# Patient Record
Sex: FEMALE | Race: WHITE | Employment: OTHER | URBAN - NONMETROPOLITAN AREA
[De-identification: names, ages, dates, MRNs, and addresses within clinical notes are randomized per-mention and may not be internally consistent; named-entity substitution may affect disease eponyms.]

---

## 2017-01-20 ENCOUNTER — OFFICE VISIT (OUTPATIENT)
Dept: PULMONOLOGY | Age: 62
End: 2017-01-20

## 2017-01-20 ENCOUNTER — TELEPHONE (OUTPATIENT)
Dept: PULMONOLOGY | Age: 62
End: 2017-01-20

## 2017-01-20 VITALS
OXYGEN SATURATION: 98 % | BODY MASS INDEX: 32.46 KG/M2 | DIASTOLIC BLOOD PRESSURE: 76 MMHG | SYSTOLIC BLOOD PRESSURE: 122 MMHG | WEIGHT: 206.8 LBS | HEART RATE: 84 BPM | HEIGHT: 67 IN | TEMPERATURE: 98 F

## 2017-01-20 DIAGNOSIS — J45.20 MILD INTERMITTENT ASTHMA WITHOUT COMPLICATION: Primary | ICD-10-CM

## 2017-01-20 DIAGNOSIS — K21.9 GASTROESOPHAGEAL REFLUX DISEASE WITHOUT ESOPHAGITIS: ICD-10-CM

## 2017-01-20 DIAGNOSIS — R05.3 CHRONIC COUGH: ICD-10-CM

## 2017-01-20 DIAGNOSIS — J01.00 SUBACUTE MAXILLARY SINUSITIS: ICD-10-CM

## 2017-01-20 PROCEDURE — 99204 OFFICE O/P NEW MOD 45 MIN: CPT | Performed by: INTERNAL MEDICINE

## 2017-01-20 RX ORDER — BUDESONIDE AND FORMOTEROL FUMARATE DIHYDRATE 160; 4.5 UG/1; UG/1
2 AEROSOL RESPIRATORY (INHALATION) 2 TIMES DAILY
Qty: 1 INHALER | Refills: 11 | Status: SHIPPED | OUTPATIENT
Start: 2017-01-20 | End: 2017-01-24 | Stop reason: SDUPTHER

## 2017-01-20 RX ORDER — MULTIVITAMIN WITH IRON
250 TABLET ORAL DAILY
COMMUNITY

## 2017-01-20 RX ORDER — ALBUTEROL SULFATE 90 UG/1
2 AEROSOL, METERED RESPIRATORY (INHALATION) EVERY 6 HOURS PRN
COMMUNITY
End: 2018-07-19

## 2017-01-20 RX ORDER — DOXYCYCLINE HYCLATE 100 MG/1
100 CAPSULE ORAL 2 TIMES DAILY
Qty: 20 CAPSULE | Refills: 0 | Status: SHIPPED | OUTPATIENT
Start: 2017-01-20 | End: 2017-01-30

## 2017-01-20 RX ORDER — BENZONATATE 200 MG/1
200 CAPSULE ORAL 3 TIMES DAILY PRN
Qty: 30 CAPSULE | Refills: 2 | Status: SHIPPED | OUTPATIENT
Start: 2017-01-20 | End: 2017-02-04

## 2017-01-20 RX ORDER — PREDNISONE 20 MG/1
20 TABLET ORAL DAILY
Qty: 10 TABLET | Refills: 0 | Status: SHIPPED | OUTPATIENT
Start: 2017-01-20 | End: 2017-01-30

## 2017-01-20 RX ORDER — MULTIVIT WITH MINERALS/LUTEIN
500 TABLET ORAL DAILY
COMMUNITY
End: 2018-06-25

## 2017-01-20 RX ORDER — PANTOPRAZOLE SODIUM 40 MG/1
40 GRANULE, DELAYED RELEASE ORAL
COMMUNITY
End: 2017-03-02 | Stop reason: SDUPTHER

## 2017-01-20 RX ORDER — PANTOPRAZOLE SODIUM 40 MG/1
40 TABLET, DELAYED RELEASE ORAL DAILY
Qty: 30 TABLET | Refills: 3 | Status: SHIPPED | OUTPATIENT
Start: 2017-01-20 | End: 2017-01-24 | Stop reason: SDUPTHER

## 2017-01-20 ASSESSMENT — ENCOUNTER SYMPTOMS
BACK PAIN: 0
BLOOD IN STOOL: 0
VOMITING: 0
COUGH: 1
APNEA: 0
SORE THROAT: 1
STRIDOR: 0
SINUS PRESSURE: 0
ABDOMINAL DISTENTION: 0
ABDOMINAL PAIN: 0
CHEST TIGHTNESS: 0
CONSTIPATION: 0
FACIAL SWELLING: 0
NAUSEA: 0
PHOTOPHOBIA: 0
VOICE CHANGE: 1
RHINORRHEA: 0
CHOKING: 1

## 2017-01-24 ENCOUNTER — TELEPHONE (OUTPATIENT)
Dept: PULMONOLOGY | Age: 62
End: 2017-01-24

## 2017-01-24 DIAGNOSIS — K21.9 GASTROESOPHAGEAL REFLUX DISEASE WITHOUT ESOPHAGITIS: ICD-10-CM

## 2017-01-24 DIAGNOSIS — R05.3 CHRONIC COUGH: Primary | ICD-10-CM

## 2017-01-24 DIAGNOSIS — J01.00 SUBACUTE MAXILLARY SINUSITIS: ICD-10-CM

## 2017-01-24 DIAGNOSIS — J45.20 MILD INTERMITTENT ASTHMA WITHOUT COMPLICATION: ICD-10-CM

## 2017-01-24 RX ORDER — PANTOPRAZOLE SODIUM 40 MG/1
40 TABLET, DELAYED RELEASE ORAL DAILY
Qty: 30 TABLET | Refills: 3 | Status: SHIPPED | OUTPATIENT
Start: 2017-01-24 | End: 2017-07-05 | Stop reason: SDUPTHER

## 2017-01-24 RX ORDER — BUDESONIDE AND FORMOTEROL FUMARATE DIHYDRATE 160; 4.5 UG/1; UG/1
2 AEROSOL RESPIRATORY (INHALATION) 2 TIMES DAILY
Qty: 1 INHALER | Refills: 11 | Status: SHIPPED | OUTPATIENT
Start: 2017-01-24 | End: 2018-03-14 | Stop reason: SDUPTHER

## 2017-01-25 ENCOUNTER — TELEPHONE (OUTPATIENT)
Dept: PULMONOLOGY | Age: 62
End: 2017-01-25

## 2017-01-27 DIAGNOSIS — R05.3 CHRONIC COUGH: ICD-10-CM

## 2017-01-30 DIAGNOSIS — R05.3 CHRONIC COUGH: ICD-10-CM

## 2017-03-02 ENCOUNTER — OFFICE VISIT (OUTPATIENT)
Dept: PULMONOLOGY | Age: 62
End: 2017-03-02

## 2017-03-02 VITALS
BODY MASS INDEX: 24.01 KG/M2 | HEIGHT: 67 IN | HEART RATE: 88 BPM | TEMPERATURE: 97 F | DIASTOLIC BLOOD PRESSURE: 79 MMHG | OXYGEN SATURATION: 98 % | SYSTOLIC BLOOD PRESSURE: 113 MMHG | WEIGHT: 153 LBS

## 2017-03-02 DIAGNOSIS — J45.909 UNCOMPLICATED ASTHMA, UNSPECIFIED ASTHMA SEVERITY: Primary | ICD-10-CM

## 2017-03-02 PROCEDURE — 99213 OFFICE O/P EST LOW 20 MIN: CPT | Performed by: INTERNAL MEDICINE

## 2017-03-02 RX ORDER — MONTELUKAST SODIUM 10 MG/1
10 TABLET ORAL DAILY
Qty: 90 TABLET | Refills: 3 | Status: SHIPPED | OUTPATIENT
Start: 2017-03-02 | End: 2017-06-26

## 2017-03-02 RX ORDER — MONTELUKAST SODIUM 10 MG/1
10 TABLET ORAL DAILY
Qty: 30 TABLET | Refills: 3 | Status: SHIPPED | OUTPATIENT
Start: 2017-03-02 | End: 2017-06-26

## 2017-03-10 LAB
CHOLESTEROL, TOTAL: NORMAL MG/DL
CHOLESTEROL/HDL RATIO: NORMAL
HDLC SERPL-MCNC: NORMAL MG/DL (ref 35–70)
LDL CHOLESTEROL CALCULATED: 107 MG/DL (ref 0–160)
T4 FREE: 1.25
TRIGL SERPL-MCNC: NORMAL MG/DL
TSH SERPL DL<=0.05 MIU/L-ACNC: 1.66 UIU/ML
VLDLC SERPL CALC-MCNC: NORMAL MG/DL

## 2017-06-26 ENCOUNTER — OFFICE VISIT (OUTPATIENT)
Dept: ENDOCRINOLOGY | Age: 62
End: 2017-06-26

## 2017-06-26 VITALS
DIASTOLIC BLOOD PRESSURE: 80 MMHG | SYSTOLIC BLOOD PRESSURE: 118 MMHG | RESPIRATION RATE: 20 BRPM | HEART RATE: 68 BPM | BODY MASS INDEX: 32.65 KG/M2 | HEIGHT: 67 IN | WEIGHT: 208 LBS

## 2017-06-26 DIAGNOSIS — E03.9 ACQUIRED HYPOTHYROIDISM: Primary | ICD-10-CM

## 2017-06-26 DIAGNOSIS — E67.3 HYPERVITAMINOSIS D: ICD-10-CM

## 2017-06-26 PROCEDURE — 99213 OFFICE O/P EST LOW 20 MIN: CPT | Performed by: INTERNAL MEDICINE

## 2017-06-26 RX ORDER — LEVOTHYROXINE SODIUM 100 MCG
100 TABLET ORAL DAILY
Qty: 90 TABLET | Refills: 3 | Status: SHIPPED | OUTPATIENT
Start: 2017-06-26 | End: 2018-05-07 | Stop reason: SDUPTHER

## 2017-07-05 DIAGNOSIS — K21.9 GASTROESOPHAGEAL REFLUX DISEASE WITHOUT ESOPHAGITIS: ICD-10-CM

## 2017-07-07 RX ORDER — PANTOPRAZOLE SODIUM 40 MG/1
TABLET, DELAYED RELEASE ORAL
Qty: 90 TABLET | Refills: 3 | Status: SHIPPED | OUTPATIENT
Start: 2017-07-07 | End: 2018-06-12 | Stop reason: SDUPTHER

## 2017-08-08 ENCOUNTER — OFFICE VISIT (OUTPATIENT)
Dept: PULMONOLOGY | Age: 62
End: 2017-08-08
Payer: COMMERCIAL

## 2017-08-08 VITALS
TEMPERATURE: 96.9 F | DIASTOLIC BLOOD PRESSURE: 82 MMHG | BODY MASS INDEX: 32.46 KG/M2 | HEART RATE: 70 BPM | WEIGHT: 206.8 LBS | OXYGEN SATURATION: 98 % | HEIGHT: 67 IN | SYSTOLIC BLOOD PRESSURE: 114 MMHG

## 2017-08-08 DIAGNOSIS — J45.909 UNCOMPLICATED ASTHMA, UNSPECIFIED ASTHMA SEVERITY: Primary | ICD-10-CM

## 2017-08-08 PROCEDURE — 99213 OFFICE O/P EST LOW 20 MIN: CPT | Performed by: INTERNAL MEDICINE

## 2018-03-14 DIAGNOSIS — J45.20 MILD INTERMITTENT ASTHMA WITHOUT COMPLICATION: ICD-10-CM

## 2018-03-14 DIAGNOSIS — J01.00 SUBACUTE MAXILLARY SINUSITIS: ICD-10-CM

## 2018-03-14 RX ORDER — BUDESONIDE AND FORMOTEROL FUMARATE DIHYDRATE 160; 4.5 UG/1; UG/1
AEROSOL RESPIRATORY (INHALATION)
Qty: 30.6 G | Refills: 3 | Status: SHIPPED | OUTPATIENT
Start: 2018-03-14 | End: 2018-07-19 | Stop reason: ALTCHOICE

## 2018-05-07 ENCOUNTER — TELEPHONE (OUTPATIENT)
Dept: INTERNAL MEDICINE CLINIC | Age: 63
End: 2018-05-07

## 2018-05-07 DIAGNOSIS — E03.9 ACQUIRED HYPOTHYROIDISM: Primary | ICD-10-CM

## 2018-05-07 RX ORDER — LEVOTHYROXINE SODIUM 88 UG/1
100 TABLET ORAL DAILY
Qty: 30 TABLET | Refills: 3 | Status: SHIPPED | OUTPATIENT
Start: 2018-05-07 | End: 2018-06-25 | Stop reason: SDUPTHER

## 2018-05-21 ENCOUNTER — TELEPHONE (OUTPATIENT)
Dept: ENDOCRINOLOGY | Age: 63
End: 2018-05-21

## 2018-05-21 DIAGNOSIS — E03.9 ACQUIRED HYPOTHYROIDISM: Primary | ICD-10-CM

## 2018-06-05 ENCOUNTER — TELEPHONE (OUTPATIENT)
Dept: INTERNAL MEDICINE CLINIC | Age: 63
End: 2018-06-05

## 2018-06-12 DIAGNOSIS — K21.9 GASTROESOPHAGEAL REFLUX DISEASE WITHOUT ESOPHAGITIS: ICD-10-CM

## 2018-06-12 RX ORDER — PANTOPRAZOLE SODIUM 40 MG/1
40 TABLET, DELAYED RELEASE ORAL DAILY
Qty: 90 TABLET | Refills: 3 | Status: SHIPPED | OUTPATIENT
Start: 2018-06-12 | End: 2019-06-26 | Stop reason: ALTCHOICE

## 2018-06-19 LAB — T4 FREE: 2.48

## 2018-06-22 ENCOUNTER — TELEPHONE (OUTPATIENT)
Dept: PULMONOLOGY | Age: 63
End: 2018-06-22

## 2018-06-22 DIAGNOSIS — R06.02 SOB (SHORTNESS OF BREATH): Primary | ICD-10-CM

## 2018-06-25 ENCOUNTER — OFFICE VISIT (OUTPATIENT)
Dept: INTERNAL MEDICINE CLINIC | Age: 63
End: 2018-06-25
Payer: MEDICARE

## 2018-06-25 VITALS
HEART RATE: 64 BPM | WEIGHT: 210 LBS | BODY MASS INDEX: 32.96 KG/M2 | SYSTOLIC BLOOD PRESSURE: 120 MMHG | HEIGHT: 67 IN | DIASTOLIC BLOOD PRESSURE: 70 MMHG

## 2018-06-25 DIAGNOSIS — E03.9 HYPOTHYROIDISM, UNSPECIFIED TYPE: Primary | ICD-10-CM

## 2018-06-25 DIAGNOSIS — E55.9 VITAMIN D DEFICIENCY: ICD-10-CM

## 2018-06-25 PROCEDURE — 99204 OFFICE O/P NEW MOD 45 MIN: CPT | Performed by: INTERNAL MEDICINE

## 2018-06-25 PROCEDURE — 1036F TOBACCO NON-USER: CPT | Performed by: INTERNAL MEDICINE

## 2018-06-25 PROCEDURE — G8417 CALC BMI ABV UP PARAM F/U: HCPCS | Performed by: INTERNAL MEDICINE

## 2018-06-25 PROCEDURE — 4040F PNEUMOC VAC/ADMIN/RCVD: CPT | Performed by: INTERNAL MEDICINE

## 2018-06-25 PROCEDURE — G8427 DOCREV CUR MEDS BY ELIG CLIN: HCPCS | Performed by: INTERNAL MEDICINE

## 2018-06-25 PROCEDURE — 1123F ACP DISCUSS/DSCN MKR DOCD: CPT | Performed by: INTERNAL MEDICINE

## 2018-06-25 PROCEDURE — 3017F COLORECTAL CA SCREEN DOC REV: CPT | Performed by: INTERNAL MEDICINE

## 2018-06-25 PROCEDURE — G8400 PT W/DXA NO RESULTS DOC: HCPCS | Performed by: INTERNAL MEDICINE

## 2018-06-25 PROCEDURE — 1090F PRES/ABSN URINE INCON ASSESS: CPT | Performed by: INTERNAL MEDICINE

## 2018-06-25 RX ORDER — LEVOTHYROXINE SODIUM 88 UG/1
100 TABLET ORAL DAILY
Qty: 90 TABLET | Refills: 3 | Status: SHIPPED | OUTPATIENT
Start: 2018-06-25 | End: 2019-07-12 | Stop reason: SDUPTHER

## 2018-06-25 RX ORDER — FLUTICASONE PROPIONATE 50 MCG
1 SPRAY, SUSPENSION (ML) NASAL DAILY
COMMUNITY
End: 2019-06-26 | Stop reason: ALTCHOICE

## 2018-06-25 ASSESSMENT — PATIENT HEALTH QUESTIONNAIRE - PHQ9
1. LITTLE INTEREST OR PLEASURE IN DOING THINGS: 0
2. FEELING DOWN, DEPRESSED OR HOPELESS: 0
SUM OF ALL RESPONSES TO PHQ9 QUESTIONS 1 & 2: 0
SUM OF ALL RESPONSES TO PHQ QUESTIONS 1-9: 0

## 2018-07-05 DIAGNOSIS — R06.02 SOB (SHORTNESS OF BREATH): ICD-10-CM

## 2018-07-19 ENCOUNTER — OFFICE VISIT (OUTPATIENT)
Dept: CARDIOLOGY CLINIC | Age: 63
End: 2018-07-19
Payer: MEDICARE

## 2018-07-19 VITALS
HEIGHT: 67 IN | DIASTOLIC BLOOD PRESSURE: 84 MMHG | WEIGHT: 210 LBS | BODY MASS INDEX: 32.96 KG/M2 | HEART RATE: 73 BPM | SYSTOLIC BLOOD PRESSURE: 108 MMHG

## 2018-07-19 DIAGNOSIS — I48.0 PAF (PAROXYSMAL ATRIAL FIBRILLATION) (HCC): ICD-10-CM

## 2018-07-19 PROCEDURE — 1101F PT FALLS ASSESS-DOCD LE1/YR: CPT | Performed by: INTERNAL MEDICINE

## 2018-07-19 PROCEDURE — 1036F TOBACCO NON-USER: CPT | Performed by: INTERNAL MEDICINE

## 2018-07-19 PROCEDURE — G8417 CALC BMI ABV UP PARAM F/U: HCPCS | Performed by: INTERNAL MEDICINE

## 2018-07-19 PROCEDURE — 3017F COLORECTAL CA SCREEN DOC REV: CPT | Performed by: INTERNAL MEDICINE

## 2018-07-19 PROCEDURE — G8400 PT W/DXA NO RESULTS DOC: HCPCS | Performed by: INTERNAL MEDICINE

## 2018-07-19 PROCEDURE — 93000 ELECTROCARDIOGRAM COMPLETE: CPT | Performed by: INTERNAL MEDICINE

## 2018-07-19 PROCEDURE — 1123F ACP DISCUSS/DSCN MKR DOCD: CPT | Performed by: INTERNAL MEDICINE

## 2018-07-19 PROCEDURE — 99205 OFFICE O/P NEW HI 60 MIN: CPT | Performed by: INTERNAL MEDICINE

## 2018-07-19 PROCEDURE — G8427 DOCREV CUR MEDS BY ELIG CLIN: HCPCS | Performed by: INTERNAL MEDICINE

## 2018-07-19 PROCEDURE — 4040F PNEUMOC VAC/ADMIN/RCVD: CPT | Performed by: INTERNAL MEDICINE

## 2018-07-19 PROCEDURE — 1090F PRES/ABSN URINE INCON ASSESS: CPT | Performed by: INTERNAL MEDICINE

## 2018-07-19 RX ORDER — AMIODARONE HYDROCHLORIDE 200 MG/1
200 TABLET ORAL DAILY
COMMUNITY
End: 2018-08-31

## 2018-07-19 RX ORDER — DILTIAZEM HYDROCHLORIDE 60 MG/1
60 TABLET, FILM COATED ORAL 2 TIMES DAILY
COMMUNITY
End: 2018-09-04 | Stop reason: ALTCHOICE

## 2018-07-19 ASSESSMENT — ENCOUNTER SYMPTOMS
BACK PAIN: 0
RIGHT EYE: 0
BLURRED VISION: 0
DIARRHEA: 0
VOMITING: 0
COUGH: 0
CONSTIPATION: 0
SHORTNESS OF BREATH: 1
LEFT EYE: 0
WHEEZING: 0
NAUSEA: 0
SORE THROAT: 0
ABDOMINAL PAIN: 0
DOUBLE VISION: 0

## 2018-07-19 NOTE — PROGRESS NOTES
[facility] [address] [place] [address], [facility]  Dept: [phone]  Dept Fax: [phone]      CARDIAC ELECTROPHYSIOLOGY  CONSULTATION    7/19/2018      REFERRING PROVIDER:  Dr. Ines Zepeda:  I have been getting short of breath and having chest pains. Chief Complaint   Patient presents with    New Patient     Gina Hoover pt, PAF       HPI:  HPI    07/19/2018: The patient is a 69 y/o female that has a h/o asthma. She reports becoming short of breath on 6/28/2018. The patient called Dr. Bailey Cruz and he sent her for a PFT. The patient went to have the PFT and she was told that her heart rhythm was abnormal and was sent to the ED. The patient was found to be in atrial fibrillation with a rapid ventricular response. The patient was started on IV Diltiazem and she converted spontaneously back to sinus rhythm. The patient states she felt immediately better after she converted back to sinus rhythm. She states that the episodes of shortness of breath are associated with chest pain. She denies having any wheezing with the episodes. The patient was started on Xarelto and Flecainide. She was seen in the office by Dr. Gina Hoover on 7/2/2018 and he switched the patient to amiodarone. The patient has been taking amiodarone for the past 11 days. She is still having episodes of shortness of breath and associated chest pain. The patient states she had an episode that began on Monday of this week and lasted for 36 hours. The patient is being referred for further evaluation and management of recurrent symptomatic paroxysmal atrial fibrillation. PMH:  History obtained from chart review and the patient.   Past Medical History:   Diagnosis Date    Anemia     Asthma     Backache     Colitis     Eczema     Fibromyalgia     Fibula fracture 1999    Hernia, hiatal 2015    Hypoglycemia     Hypothyroidism     [facility]    Perianal abscess Mouth/Throat: Oropharynx is clear and moist. Mucous membranes are not dry. No oropharyngeal exudate. Eyes: Conjunctivae and EOM are normal. Pupils are equal, round, and reactive to light. Right eye exhibits no discharge. Left eye exhibits no discharge. Neck: Trachea normal. Neck supple. No JVD present. No edema present. No thyroid mass present. Cardiovascular: Normal rate, regular rhythm, normal heart sounds and normal pulses. No extrasystoles are present. Pulmonary/Chest: Effort normal and breath sounds normal. She exhibits no tenderness. Breasts are symmetrical.   Abdominal: Soft. Normal appearance and bowel sounds are normal. She exhibits no mass. There is no hepatosplenomegaly. There is no tenderness. No hernia. Musculoskeletal: Normal range of motion. Right shoulder: Normal. She exhibits normal range of motion and no swelling. Left shoulder: Normal. She exhibits normal range of motion and no swelling. Right hip: Normal. She exhibits normal range of motion and no swelling. Left hip: Normal. She exhibits normal range of motion and no swelling. Right knee: Normal. She exhibits normal range of motion and no swelling. Left knee: Normal. She exhibits normal range of motion and no swelling. Right upper arm: Normal.        Left upper arm: Normal.        Right upper leg: Normal.        Left upper leg: Normal.        Right lower leg: Normal.        Left lower leg: Normal.   Lymphadenopathy:        Head (right side): No submandibular adenopathy present. Head (left side): No submandibular adenopathy present. Neurological: She is alert and oriented to person, place, and time. She has normal strength. She displays normal reflexes. No cranial nerve deficit or sensory deficit. Coordination and gait normal.   Skin: Skin is warm and dry. No lesion and no rash noted. She is not diaphoretic. No cyanosis. Nails show no clubbing.    Psychiatric: Her speech is Brad Herbert MD on 7/19/2018 at 9:00 AM

## 2018-07-30 ENCOUNTER — HOSPITAL ENCOUNTER (OUTPATIENT)
Age: 63
Discharge: HOME | End: 2018-07-30
Payer: MEDICARE

## 2018-07-30 ENCOUNTER — HOSPITAL ENCOUNTER (OUTPATIENT)
Dept: CT IMAGING | Age: 63
Discharge: HOME | End: 2018-07-30
Payer: MEDICARE

## 2018-07-30 DIAGNOSIS — I48.0 PAF (PAROXYSMAL ATRIAL FIBRILLATION) (HCC): ICD-10-CM

## 2018-07-30 LAB
ANION GAP SERPL CALCULATED.3IONS-SCNC: 14 MEQ/L (ref 8–16)
BUN BLDV-MCNC: 19 MG/DL (ref 7–22)
CALCIUM SERPL-MCNC: 9.6 MG/DL (ref 8.5–10.5)
CHLORIDE BLD-SCNC: 104 MEQ/L (ref 98–111)
CO2: 25 MEQ/L (ref 23–33)
CREAT SERPL-MCNC: 1 MG/DL (ref 0.4–1.2)
ERYTHROCYTE [DISTWIDTH] IN BLOOD BY AUTOMATED COUNT: 14.2 % (ref 11.5–14.5)
ERYTHROCYTE [DISTWIDTH] IN BLOOD BY AUTOMATED COUNT: 47.2 FL (ref 35–45)
GFR SERPL CREATININE-BSD FRML MDRD: 56 ML/MIN/1.73M2
GLUCOSE BLD-MCNC: 93 MG/DL (ref 70–108)
HCT VFR BLD CALC: 40.8 % (ref 37–47)
HEMOGLOBIN: 13.3 GM/DL (ref 12–16)
MCH RBC QN AUTO: 29.7 PG (ref 26–33)
MCHC RBC AUTO-ENTMCNC: 32.6 GM/DL (ref 32.2–35.5)
MCV RBC AUTO: 91.1 FL (ref 81–99)
PLATELET # BLD: 207 THOU/MM3 (ref 130–400)
PMV BLD AUTO: 10.9 FL (ref 9.4–12.4)
POC CREATININE WHOLE BLOOD: 1 MG/DL (ref 0.5–1.2)
POTASSIUM SERPL-SCNC: 4.5 MEQ/L (ref 3.5–5.2)
RBC # BLD: 4.48 MILL/MM3 (ref 4.2–5.4)
SODIUM BLD-SCNC: 143 MEQ/L (ref 135–145)
WBC # BLD: 4.1 THOU/MM3 (ref 4.8–10.8)

## 2018-07-30 PROCEDURE — 85027 COMPLETE CBC AUTOMATED: CPT

## 2018-07-30 PROCEDURE — 82565 ASSAY OF CREATININE: CPT

## 2018-07-30 PROCEDURE — 6360000004 HC RX CONTRAST MEDICATION: Performed by: INTERNAL MEDICINE

## 2018-07-30 PROCEDURE — 75572 CT HRT W/3D IMAGE: CPT

## 2018-07-30 PROCEDURE — 80048 BASIC METABOLIC PNL TOTAL CA: CPT

## 2018-07-30 PROCEDURE — 36415 COLL VENOUS BLD VENIPUNCTURE: CPT

## 2018-07-30 RX ADMIN — IOPAMIDOL 80 ML: 755 INJECTION, SOLUTION INTRAVENOUS at 11:05

## 2018-07-31 ENCOUNTER — TELEPHONE (OUTPATIENT)
Dept: CARDIOLOGY CLINIC | Age: 63
End: 2018-07-31

## 2018-08-05 ENCOUNTER — PREP FOR PROCEDURE (OUTPATIENT)
Dept: CARDIOLOGY | Age: 63
End: 2018-08-05

## 2018-08-05 RX ORDER — SODIUM CHLORIDE 0.9 % (FLUSH) 0.9 %
10 SYRINGE (ML) INJECTION PRN
Status: CANCELLED | OUTPATIENT
Start: 2018-08-05 | End: 2019-08-05

## 2018-08-05 RX ORDER — SODIUM CHLORIDE 0.9 % (FLUSH) 0.9 %
10 SYRINGE (ML) INJECTION EVERY 12 HOURS SCHEDULED
Status: CANCELLED | OUTPATIENT
Start: 2018-08-05 | End: 2019-08-05

## 2018-08-06 ENCOUNTER — ANESTHESIA EVENT (OUTPATIENT)
Dept: CARDIAC CATH/INVASIVE PROCEDURES | Age: 63
End: 2018-08-06
Payer: MEDICARE

## 2018-08-06 ENCOUNTER — ANESTHESIA (OUTPATIENT)
Dept: CARDIAC CATH/INVASIVE PROCEDURES | Age: 63
End: 2018-08-06
Payer: MEDICARE

## 2018-08-06 ENCOUNTER — APPOINTMENT (OUTPATIENT)
Dept: CARDIAC CATH/INVASIVE PROCEDURES | Age: 63
End: 2018-08-06
Attending: INTERNAL MEDICINE
Payer: MEDICARE

## 2018-08-06 VITALS — SYSTOLIC BLOOD PRESSURE: 156 MMHG | DIASTOLIC BLOOD PRESSURE: 88 MMHG | OXYGEN SATURATION: 98 % | TEMPERATURE: 95.9 F

## 2018-08-06 PROBLEM — Z86.79 S/P ABLATION OPERATION FOR ARRHYTHMIA: Status: ACTIVE | Noted: 2018-08-06

## 2018-08-06 PROBLEM — Z98.890 S/P ABLATION OPERATION FOR ARRHYTHMIA: Status: ACTIVE | Noted: 2018-08-06

## 2018-08-06 PROCEDURE — 3700000000 HC ANESTHESIA ATTENDED CARE

## 2018-08-06 PROCEDURE — 2500000003 HC RX 250 WO HCPCS: Performed by: NURSE ANESTHETIST, CERTIFIED REGISTERED

## 2018-08-06 PROCEDURE — 2580000003 HC RX 258: Performed by: NURSE ANESTHETIST, CERTIFIED REGISTERED

## 2018-08-06 PROCEDURE — 7100000001 HC PACU RECOVERY - ADDTL 15 MIN

## 2018-08-06 PROCEDURE — 6360000002 HC RX W HCPCS: Performed by: NURSE ANESTHETIST, CERTIFIED REGISTERED

## 2018-08-06 PROCEDURE — 3700000001 HC ADD 15 MINUTES (ANESTHESIA)

## 2018-08-06 PROCEDURE — 7100000000 HC PACU RECOVERY - FIRST 15 MIN

## 2018-08-06 RX ORDER — DEXAMETHASONE SODIUM PHOSPHATE 4 MG/ML
INJECTION, SOLUTION INTRA-ARTICULAR; INTRALESIONAL; INTRAMUSCULAR; INTRAVENOUS; SOFT TISSUE PRN
Status: DISCONTINUED | OUTPATIENT
Start: 2018-08-06 | End: 2018-08-06 | Stop reason: SDUPTHER

## 2018-08-06 RX ORDER — MIDAZOLAM HYDROCHLORIDE 1 MG/ML
INJECTION INTRAMUSCULAR; INTRAVENOUS PRN
Status: DISCONTINUED | OUTPATIENT
Start: 2018-08-06 | End: 2018-08-06 | Stop reason: SDUPTHER

## 2018-08-06 RX ORDER — EPHEDRINE SULFATE 50 MG/ML
INJECTION INTRAVENOUS PRN
Status: DISCONTINUED | OUTPATIENT
Start: 2018-08-06 | End: 2018-08-06 | Stop reason: SDUPTHER

## 2018-08-06 RX ORDER — PROTAMINE SULFATE 10 MG/ML
INJECTION, SOLUTION INTRAVENOUS PRN
Status: DISCONTINUED | OUTPATIENT
Start: 2018-08-06 | End: 2018-08-06 | Stop reason: SDUPTHER

## 2018-08-06 RX ORDER — PROPOFOL 10 MG/ML
INJECTION, EMULSION INTRAVENOUS PRN
Status: DISCONTINUED | OUTPATIENT
Start: 2018-08-06 | End: 2018-08-06 | Stop reason: SDUPTHER

## 2018-08-06 RX ORDER — LIDOCAINE HYDROCHLORIDE 20 MG/ML
INJECTION, SOLUTION INFILTRATION; PERINEURAL PRN
Status: DISCONTINUED | OUTPATIENT
Start: 2018-08-06 | End: 2018-08-06 | Stop reason: SDUPTHER

## 2018-08-06 RX ORDER — GLYCOPYRROLATE 1 MG/5 ML
SYRINGE (ML) INTRAVENOUS PRN
Status: DISCONTINUED | OUTPATIENT
Start: 2018-08-06 | End: 2018-08-06 | Stop reason: SDUPTHER

## 2018-08-06 RX ORDER — SODIUM CHLORIDE 9 MG/ML
INJECTION, SOLUTION INTRAVENOUS CONTINUOUS PRN
Status: DISCONTINUED | OUTPATIENT
Start: 2018-08-06 | End: 2018-08-06 | Stop reason: SDUPTHER

## 2018-08-06 RX ORDER — SUCCINYLCHOLINE/SOD CL,ISO/PF 100 MG/5ML
SYRINGE (ML) INTRAVENOUS PRN
Status: DISCONTINUED | OUTPATIENT
Start: 2018-08-06 | End: 2018-08-06 | Stop reason: SDUPTHER

## 2018-08-06 RX ORDER — FENTANYL CITRATE 50 UG/ML
INJECTION, SOLUTION INTRAMUSCULAR; INTRAVENOUS PRN
Status: DISCONTINUED | OUTPATIENT
Start: 2018-08-06 | End: 2018-08-06 | Stop reason: SDUPTHER

## 2018-08-06 RX ADMIN — Medication 0.2 MG: at 09:03

## 2018-08-06 RX ADMIN — EPHEDRINE SULFATE 10 MG: 50 INJECTION, SOLUTION INTRAVENOUS at 09:32

## 2018-08-06 RX ADMIN — FENTANYL CITRATE 50 MCG: 50 INJECTION INTRAMUSCULAR; INTRAVENOUS at 10:27

## 2018-08-06 RX ADMIN — Medication 120 MG: at 08:36

## 2018-08-06 RX ADMIN — PROPOFOL 150 MG: 10 INJECTION, EMULSION INTRAVENOUS at 08:36

## 2018-08-06 RX ADMIN — FENTANYL CITRATE 100 MCG: 50 INJECTION INTRAMUSCULAR; INTRAVENOUS at 08:36

## 2018-08-06 RX ADMIN — EPHEDRINE SULFATE 10 MG: 50 INJECTION, SOLUTION INTRAVENOUS at 09:31

## 2018-08-06 RX ADMIN — SODIUM CHLORIDE: 9 INJECTION, SOLUTION INTRAVENOUS at 11:32

## 2018-08-06 RX ADMIN — LIDOCAINE HYDROCHLORIDE 60 MG: 20 INJECTION, SOLUTION INFILTRATION; PERINEURAL at 08:36

## 2018-08-06 RX ADMIN — Medication 0.2 MG: at 09:09

## 2018-08-06 RX ADMIN — DEXAMETHASONE SODIUM PHOSPHATE 8 MG: 4 INJECTION, SOLUTION INTRAMUSCULAR; INTRAVENOUS at 09:14

## 2018-08-06 RX ADMIN — PROPOFOL 20 MG: 10 INJECTION, EMULSION INTRAVENOUS at 11:34

## 2018-08-06 RX ADMIN — PROTAMINE SULFATE 50 MG: 10 INJECTION, SOLUTION INTRAVENOUS at 11:21

## 2018-08-06 RX ADMIN — FENTANYL CITRATE 50 MCG: 50 INJECTION INTRAMUSCULAR; INTRAVENOUS at 10:00

## 2018-08-06 RX ADMIN — MIDAZOLAM HYDROCHLORIDE 2 MG: 1 INJECTION, SOLUTION INTRAMUSCULAR; INTRAVENOUS at 08:32

## 2018-08-06 RX ADMIN — SODIUM CHLORIDE: 9 INJECTION, SOLUTION INTRAVENOUS at 08:29

## 2018-08-06 RX ADMIN — PROPOFOL 30 MG: 10 INJECTION, EMULSION INTRAVENOUS at 11:25

## 2018-08-06 ASSESSMENT — PULMONARY FUNCTION TESTS
PIF_VALUE: 19
PIF_VALUE: 17
PIF_VALUE: 19
PIF_VALUE: 17
PIF_VALUE: 19
PIF_VALUE: 21
PIF_VALUE: 17
PIF_VALUE: 19
PIF_VALUE: 4
PIF_VALUE: 1
PIF_VALUE: 17
PIF_VALUE: 18
PIF_VALUE: 19
PIF_VALUE: 18
PIF_VALUE: 17
PIF_VALUE: 18
PIF_VALUE: 20
PIF_VALUE: 18
PIF_VALUE: 19
PIF_VALUE: 5
PIF_VALUE: 21
PIF_VALUE: 19
PIF_VALUE: 18
PIF_VALUE: 4
PIF_VALUE: 17
PIF_VALUE: 18
PIF_VALUE: 1
PIF_VALUE: 21
PIF_VALUE: 19
PIF_VALUE: 18
PIF_VALUE: 19
PIF_VALUE: 18
PIF_VALUE: 19
PIF_VALUE: 5
PIF_VALUE: 17
PIF_VALUE: 17
PIF_VALUE: 18
PIF_VALUE: 4
PIF_VALUE: 20
PIF_VALUE: 18
PIF_VALUE: 19
PIF_VALUE: 17
PIF_VALUE: 22
PIF_VALUE: 18
PIF_VALUE: 19
PIF_VALUE: 17
PIF_VALUE: 19
PIF_VALUE: 5
PIF_VALUE: 19
PIF_VALUE: 19
PIF_VALUE: 18
PIF_VALUE: 19
PIF_VALUE: 4
PIF_VALUE: 18
PIF_VALUE: 18
PIF_VALUE: 17
PIF_VALUE: 1
PIF_VALUE: 19
PIF_VALUE: 18
PIF_VALUE: 19
PIF_VALUE: 18
PIF_VALUE: 20
PIF_VALUE: 19
PIF_VALUE: 4
PIF_VALUE: 19
PIF_VALUE: 1
PIF_VALUE: 19
PIF_VALUE: 18
PIF_VALUE: 2
PIF_VALUE: 18
PIF_VALUE: 19
PIF_VALUE: 4
PIF_VALUE: 18
PIF_VALUE: 17
PIF_VALUE: 17
PIF_VALUE: 18
PIF_VALUE: 18
PIF_VALUE: 4
PIF_VALUE: 0
PIF_VALUE: 18
PIF_VALUE: 2
PIF_VALUE: 18
PIF_VALUE: 18
PIF_VALUE: 17
PIF_VALUE: 19
PIF_VALUE: 18
PIF_VALUE: 17
PIF_VALUE: 19
PIF_VALUE: 19
PIF_VALUE: 17
PIF_VALUE: 19
PIF_VALUE: 19
PIF_VALUE: 23
PIF_VALUE: 19
PIF_VALUE: 19
PIF_VALUE: 17
PIF_VALUE: 19
PIF_VALUE: 19
PIF_VALUE: 21
PIF_VALUE: 19
PIF_VALUE: 19
PIF_VALUE: 18
PIF_VALUE: 20
PIF_VALUE: 4
PIF_VALUE: 19
PIF_VALUE: 18
PIF_VALUE: 19
PIF_VALUE: 4
PIF_VALUE: 18
PIF_VALUE: 19
PIF_VALUE: 4
PIF_VALUE: 19
PIF_VALUE: 18
PIF_VALUE: 19
PIF_VALUE: 18
PIF_VALUE: 20
PIF_VALUE: 17
PIF_VALUE: 17
PIF_VALUE: 19
PIF_VALUE: 1
PIF_VALUE: 19
PIF_VALUE: 18
PIF_VALUE: 17
PIF_VALUE: 17
PIF_VALUE: 18
PIF_VALUE: 17
PIF_VALUE: 17
PIF_VALUE: 4
PIF_VALUE: 18
PIF_VALUE: 25
PIF_VALUE: 18
PIF_VALUE: 18
PIF_VALUE: 17
PIF_VALUE: 18
PIF_VALUE: 19
PIF_VALUE: 18
PIF_VALUE: 17
PIF_VALUE: 19
PIF_VALUE: 18
PIF_VALUE: 20
PIF_VALUE: 20
PIF_VALUE: 1
PIF_VALUE: 18
PIF_VALUE: 19
PIF_VALUE: 18
PIF_VALUE: 18
PIF_VALUE: 5
PIF_VALUE: 18
PIF_VALUE: 1
PIF_VALUE: 18
PIF_VALUE: 19
PIF_VALUE: 18
PIF_VALUE: 19
PIF_VALUE: 17
PIF_VALUE: 20
PIF_VALUE: 19
PIF_VALUE: 19
PIF_VALUE: 18
PIF_VALUE: 3
PIF_VALUE: 6
PIF_VALUE: 18
PIF_VALUE: 18

## 2018-08-06 NOTE — ANESTHESIA PRE PROCEDURE
Dental:          Pulmonary:   (+) asthma:                            Cardiovascular:    (+) dysrhythmias:,       ECG reviewed      Echocardiogram reviewed  Stress test reviewed  Cleared by cardiology              Neuro/Psych:               GI/Hepatic/Renal:   (+) GERD:,           Endo/Other:    (+) hypothyroidism::., .                 Abdominal:           Vascular:                                        Anesthesia Plan      general     ASA 3       Induction: intravenous. MIPS: Postoperative opioids intended and Prophylactic antiemetics administered. Anesthetic plan and risks discussed with patient. Plan discussed with CRNA. Leif Salas.  [address] [place] [address]   8/6/2018

## 2018-08-07 ENCOUNTER — TELEPHONE (OUTPATIENT)
Dept: CARDIOLOGY CLINIC | Age: 63
End: 2018-08-07

## 2018-08-07 NOTE — TELEPHONE ENCOUNTER
Patient is being discharged from [facility] today, s/p ablation and is to follow up in one month. She is scheduled on 9/25/18 which is first available. Please contact patient if that needs to be moved up, [phone].

## 2018-08-09 ENCOUNTER — TELEPHONE (OUTPATIENT)
Dept: CARDIOLOGY CLINIC | Age: 63
End: 2018-08-09

## 2018-08-09 DIAGNOSIS — E03.9 HYPOTHYROIDISM, UNSPECIFIED TYPE: ICD-10-CM

## 2018-08-09 NOTE — TELEPHONE ENCOUNTER
Pt called in asking for brand name synthroid script. They sent her generic. She says she has tried generic synthroid and just does not work with her.

## 2018-08-09 NOTE — TELEPHONE ENCOUNTER
PT NOTIFIED AND INFORMED HER OF THE INSTRUCTIONS OUTLINED PER DR JOHNSON. PT SAID SHE ALSO HAS A HEADACHE THAT WILL NOT GO AWAY.  TOLD PT THAT IF THIS DOES NOT SUBSIDE, TO CALL THIS OFFICE BACK PT VERBALIZES UNDERSTANDING

## 2018-08-10 RX ORDER — LEVOTHYROXINE SODIUM 88 MCG
88 TABLET ORAL DAILY
Qty: 90 TABLET | Refills: 3 | Status: SHIPPED | OUTPATIENT
Start: 2018-08-10 | End: 2018-10-10

## 2018-08-31 ENCOUNTER — OFFICE VISIT (OUTPATIENT)
Dept: PULMONOLOGY | Age: 63
End: 2018-08-31
Payer: MEDICARE

## 2018-08-31 VITALS
DIASTOLIC BLOOD PRESSURE: 80 MMHG | BODY MASS INDEX: 32.96 KG/M2 | HEIGHT: 67 IN | WEIGHT: 210 LBS | OXYGEN SATURATION: 96 % | SYSTOLIC BLOOD PRESSURE: 122 MMHG | HEART RATE: 82 BPM

## 2018-08-31 DIAGNOSIS — J45.20 MILD INTERMITTENT ASTHMA WITHOUT COMPLICATION: Primary | ICD-10-CM

## 2018-08-31 DIAGNOSIS — I48.91 ATRIAL FIBRILLATION, UNSPECIFIED TYPE (HCC): ICD-10-CM

## 2018-08-31 PROCEDURE — 1036F TOBACCO NON-USER: CPT | Performed by: NURSE PRACTITIONER

## 2018-08-31 PROCEDURE — 4040F PNEUMOC VAC/ADMIN/RCVD: CPT | Performed by: NURSE PRACTITIONER

## 2018-08-31 PROCEDURE — G8427 DOCREV CUR MEDS BY ELIG CLIN: HCPCS | Performed by: NURSE PRACTITIONER

## 2018-08-31 PROCEDURE — 99213 OFFICE O/P EST LOW 20 MIN: CPT | Performed by: NURSE PRACTITIONER

## 2018-08-31 PROCEDURE — G8400 PT W/DXA NO RESULTS DOC: HCPCS | Performed by: NURSE PRACTITIONER

## 2018-08-31 PROCEDURE — G8417 CALC BMI ABV UP PARAM F/U: HCPCS | Performed by: NURSE PRACTITIONER

## 2018-08-31 PROCEDURE — 1123F ACP DISCUSS/DSCN MKR DOCD: CPT | Performed by: NURSE PRACTITIONER

## 2018-08-31 PROCEDURE — 3017F COLORECTAL CA SCREEN DOC REV: CPT | Performed by: NURSE PRACTITIONER

## 2018-08-31 PROCEDURE — 1090F PRES/ABSN URINE INCON ASSESS: CPT | Performed by: NURSE PRACTITIONER

## 2018-08-31 PROCEDURE — 1101F PT FALLS ASSESS-DOCD LE1/YR: CPT | Performed by: NURSE PRACTITIONER

## 2018-08-31 RX ORDER — BUDESONIDE AND FORMOTEROL FUMARATE DIHYDRATE 80; 4.5 UG/1; UG/1
2 AEROSOL RESPIRATORY (INHALATION) 2 TIMES DAILY
COMMUNITY
End: 2019-08-02 | Stop reason: SDUPTHER

## 2018-08-31 RX ORDER — LEVALBUTEROL TARTRATE 45 UG/1
1-2 AEROSOL, METERED ORAL EVERY 4 HOURS PRN
Qty: 1 INHALER | Refills: 5 | Status: SHIPPED | OUTPATIENT
Start: 2018-08-31

## 2018-08-31 RX ORDER — LEVALBUTEROL TARTRATE 45 UG/1
1-2 AEROSOL, METERED ORAL EVERY 4 HOURS PRN
COMMUNITY
End: 2018-08-31 | Stop reason: SDUPTHER

## 2018-08-31 RX ORDER — ASPIRIN 325 MG
325 TABLET ORAL DAILY
COMMUNITY
End: 2018-09-04 | Stop reason: ALTCHOICE

## 2018-08-31 ASSESSMENT — ENCOUNTER SYMPTOMS
WHEEZING: 0
DIARRHEA: 0
EYES NEGATIVE: 1
HEMOPTYSIS: 0
SPUTUM PRODUCTION: 0
VOMITING: 0
ABDOMINAL PAIN: 0
SHORTNESS OF BREATH: 0
NAUSEA: 0
COUGH: 0

## 2018-08-31 NOTE — PROGRESS NOTES
[id number]     SOCIAL HISTORY:  Social History   Substance Use Topics    Smoking status: Passive Smoke Exposure - Never Smoker    Smokeless tobacco: Never Used    Alcohol use 0.0 oz/week      Comment: occ     ALLERGIES:  Allergies   Allergen Reactions    Nexium [Esomeprazole Magnesium] Other (See Comments)     headaches    Cipro Xr Nausea And Vomiting     FAMILY HISTORY:  Family History   Problem Relation Age of Onset   Michail Schwab Migraines Mother     Hypertension Mother     Stroke Mother     Arthritis Father     Cancer Father         lung    Parkinsonism Brother      CURRENT MEDICATIONS:  Current Outpatient Prescriptions   Medication Sig Dispense Refill    aspirin 325 MG tablet Take 325 mg by mouth daily      budesonide-formoterol (SYMBICORT) 80-4.5 MCG/ACT AERO Inhale 2 puffs into the lungs 2 times daily      levalbuterol (XOPENEX HFA) 45 MCG/ACT inhaler Inhale 1-2 puffs into the lungs every 4 hours as needed for Wheezing or Shortness of Breath 1 Inhaler 5    SYNTHROID 88 MCG tablet Take 1 tablet by mouth Daily 90 tablet 3    diltiazem (CARDIZEM) 60 MG tablet Take 60 mg by mouth 2 times daily      NONFORMULARY Arthro 7 one tablet once daily      fluticasone (FLONASE) 50 MCG/ACT nasal spray 1 spray by Nasal route daily      levothyroxine (SYNTHROID) 88 MCG tablet Take 1 tablet by mouth Daily 90 tablet 3    pantoprazole (PROTONIX) 40 MG tablet Take 1 tablet by mouth daily 90 tablet 3    magnesium (MAGNESIUM-OXIDE) 250 MG TABS tablet Take 250 mg by mouth daily      Probiotic Product (PROBIOTIC PO) Take 1 capsule by mouth daily       Multiple Vitamins-Minerals (THERAPEUTIC MULTIVITAMIN-MINERALS) tablet Take 1 tablet by mouth daily      polyethylene glycol (GLYCOLAX) powder Take 17 g by mouth daily       No current facility-administered medications for this visit. Karen BOO   Review of Systems   Constitutional: Negative for chills, fever, malaise/fatigue and weight loss. HENT: Negative.     Eyes: controlled  Refill Xopenex (does not tolerate albuterol due to tachycardia and new h/o afib)  Annual spirometry   On Cardizem and ASA for afib- follows with cardiology     Will see Belén Kaur back in: 1 year with fritz     Electronically signed by MYLA Simmons CNP on 8/31/2018 at 11:42 AM  8/31/2018

## 2018-08-31 NOTE — PATIENT INSTRUCTIONS
Health Maintenance Due   Topic Date Due    Hepatitis C screen  1955    HIV screen  02/27/1968    DTaP/Tdap/Td vaccine (1 - Tdap) 02/27/1972    Cervical cancer screen  02/27/1974    Breast cancer screen  02/27/2003    Shingles Vaccine (1 of 2 - 2 Dose Series) 02/27/2003    Colon cancer screen colonoscopy  02/27/2003    DEXA (modify frequency per FRAX score)  02/27/2018    Pneumococcal low/med risk (1 of 2 - PCV13) 02/27/2018

## 2018-09-04 ENCOUNTER — OFFICE VISIT (OUTPATIENT)
Dept: CARDIOLOGY CLINIC | Age: 63
End: 2018-09-04
Payer: MEDICARE

## 2018-09-04 VITALS
SYSTOLIC BLOOD PRESSURE: 108 MMHG | BODY MASS INDEX: 33.59 KG/M2 | HEART RATE: 60 BPM | DIASTOLIC BLOOD PRESSURE: 62 MMHG | WEIGHT: 214 LBS | HEIGHT: 67 IN

## 2018-09-04 DIAGNOSIS — I48.0 PAROXYSMAL ATRIAL FIBRILLATION (HCC): Primary | ICD-10-CM

## 2018-09-04 PROCEDURE — 4040F PNEUMOC VAC/ADMIN/RCVD: CPT | Performed by: INTERNAL MEDICINE

## 2018-09-04 PROCEDURE — 3017F COLORECTAL CA SCREEN DOC REV: CPT | Performed by: INTERNAL MEDICINE

## 2018-09-04 PROCEDURE — G8417 CALC BMI ABV UP PARAM F/U: HCPCS | Performed by: INTERNAL MEDICINE

## 2018-09-04 PROCEDURE — G8427 DOCREV CUR MEDS BY ELIG CLIN: HCPCS | Performed by: INTERNAL MEDICINE

## 2018-09-04 PROCEDURE — 1123F ACP DISCUSS/DSCN MKR DOCD: CPT | Performed by: INTERNAL MEDICINE

## 2018-09-04 PROCEDURE — 1090F PRES/ABSN URINE INCON ASSESS: CPT | Performed by: INTERNAL MEDICINE

## 2018-09-04 PROCEDURE — 93000 ELECTROCARDIOGRAM COMPLETE: CPT | Performed by: INTERNAL MEDICINE

## 2018-09-04 PROCEDURE — 1036F TOBACCO NON-USER: CPT | Performed by: INTERNAL MEDICINE

## 2018-09-04 PROCEDURE — 99213 OFFICE O/P EST LOW 20 MIN: CPT | Performed by: INTERNAL MEDICINE

## 2018-09-04 PROCEDURE — G8400 PT W/DXA NO RESULTS DOC: HCPCS | Performed by: INTERNAL MEDICINE

## 2018-09-04 PROCEDURE — 1101F PT FALLS ASSESS-DOCD LE1/YR: CPT | Performed by: INTERNAL MEDICINE

## 2018-09-04 ASSESSMENT — ENCOUNTER SYMPTOMS
DIARRHEA: 0
COUGH: 0
SHORTNESS OF BREATH: 0
ABDOMINAL PAIN: 0
CONSTIPATION: 0
BLURRED VISION: 0
RIGHT EYE: 0
LEFT EYE: 0
VOMITING: 0
DOUBLE VISION: 0
BACK PAIN: 0
SORE THROAT: 0
WHEEZING: 0
NAUSEA: 0

## 2018-09-04 NOTE — PROGRESS NOTES
hernia, GERD     Hernia, hiatal 2015    Hypoglycemia     Hypothyroidism     [facility]oxys[facility] 7/19/2018    Perianal abscess 2001    Ruptured disk 1996    Torn ligament 2005    right thumb     Past Surgical History:      Procedure Laterality Date    APPENDECTOMY  1986    BACK SURGERY      BARTHOLIN GLAND CYST EXCISION  2002    CHOLECYSTECTOMY  2002    COLONOSCOPY  8525,[id number],3299    CYSTOCELE REPAIR  05/2001, 11/2001   [place] HERNIA REPAIR  10/2017    HYSTERECTOMY  1986    KNEE SURGERY Right 03/2016    meniscus repair    PARTIAL HYSTERECTOMY  1986    RECTOCELE REPAIR  2002    STOMACH SURGERY  October    UPPER GASTROINTESTINAL ENDOSCOPY  2005,2015    VARICOSE VEIN SURGERY  0397,7180     Past Family History:   Family History   Problem Relation Age of Onset   Fuentes Scripture Migraines Mother     Hypertension Mother     Stroke Mother     Arthritis Father     Cancer Father         lung    Parkinsonism Brother       Past Social History:     Social History     Social History    Marital status:      Spouse name: N/A    Number of children: N/A    Years of education: N/A     Social History Main Topics    Smoking status: Passive Smoke Exposure - Never Smoker    Smokeless tobacco: Never Used    Alcohol use 0.0 oz/week      Comment: occ    Drug use: No    Sexual activity: Yes     Partners: Male     Other Topics Concern    Not on file     Social History Narrative    No narrative on file       Medications:   Scheduled Meds:  Continuous Infusions:  CBC: No results for input(s): WBC, HGB, PLT in the last 72 hours. BMP:  No results for input(s): NA, K, CL, CO2, BUN, CREATININE, GLUCOSE in the last 72 hours. Hepatic: No results for input(s): AST, ALT, ALB, BILITOT, ALKPHOS in the last 72 hours. Troponin: No results for input(s): TROPONINI in the last 72 hours. BNP: No results for input(s): BNP in the last 72 hours.   Lipids: No results for input(s): CHOL, discharge. Left eye exhibits no discharge. Neck: Trachea normal. Neck supple. No JVD present. No edema present. No thyroid mass present. Cardiovascular: Normal rate, regular rhythm, normal heart sounds and normal pulses. No extrasystoles are present. Pulmonary/Chest: Effort normal and breath sounds normal. She exhibits no tenderness. Breasts are symmetrical.   Abdominal: Soft. Normal appearance and bowel sounds are normal. She exhibits no mass. There is no hepatosplenomegaly. There is no tenderness. No hernia. Musculoskeletal: Normal range of motion. Right shoulder: Normal. She exhibits normal range of motion and no swelling. Left shoulder: Normal. She exhibits normal range of motion and no swelling. Right hip: Normal. She exhibits normal range of motion and no swelling. Left hip: Normal. She exhibits normal range of motion and no swelling. Right knee: Normal. She exhibits normal range of motion and no swelling. Left knee: Normal. She exhibits normal range of motion and no swelling. Right upper arm: Normal.        Left upper arm: Normal.        Right upper leg: Normal.        Left upper leg: Normal.        Right lower leg: Normal.        Left lower leg: Normal.   Lymphadenopathy:        Head (right side): No submandibular adenopathy present. Head (left side): No submandibular adenopathy present. Neurological: She is alert and oriented to person, place, and time. She has normal strength. She displays normal reflexes. No cranial nerve deficit or sensory deficit. Coordination and gait normal.   Skin: Skin is warm and dry. No lesion and no rash noted. She is not diaphoretic. No cyanosis. Nails show no clubbing. Psychiatric: Her speech is normal and behavior is normal. Judgment and thought content normal. Her mood appears not anxious. Her affect is not angry. Cognition and memory are normal. She does not exhibit a depressed mood.    Nursing note and vitals reviewed. DIAGNOSTIC STUDIES & LABORATORY DATA:     I have personally reviewed and interpreted the results of the following diagnostic testing  CARDIAC HISTORY:     ECHO: 2018      EC2018 NSR, HR 60 bpm  2018 NSR, WAYNE, PVC's    Assessment / Acute Cardiac Problems:   1. Paroxysmal Atrial Fibrillation:  The patient is s/p PVI ablation with cryotherapy on 2018. Patient Active Problem List:     Thyroiditis, chronic     Hypothyroidism     Hypoglycemia     Vitamin D deficiency     Weight gain     Paroxysmal atrial fibrillation (HCC)     S/P ablation operation for arrhythmia      Plan of Treatment:   1. Stop Diltiazem  2. Stop ASA.   3. F/U in one month

## 2018-09-04 NOTE — PROGRESS NOTES
Patient has chest pain, she feels its from her hiatal hernia, No sob, no edema. No palpitation, no dizzines. Patient complains of fatigue.

## 2018-10-10 ENCOUNTER — OFFICE VISIT (OUTPATIENT)
Dept: CARDIOLOGY CLINIC | Age: 63
End: 2018-10-10
Payer: MEDICARE

## 2018-10-10 VITALS
DIASTOLIC BLOOD PRESSURE: 64 MMHG | HEIGHT: 67 IN | BODY MASS INDEX: 32.96 KG/M2 | HEART RATE: 77 BPM | SYSTOLIC BLOOD PRESSURE: 110 MMHG | WEIGHT: 210 LBS

## 2018-10-10 DIAGNOSIS — I48.0 PAROXYSMAL ATRIAL FIBRILLATION (HCC): Primary | ICD-10-CM

## 2018-10-10 PROCEDURE — G8427 DOCREV CUR MEDS BY ELIG CLIN: HCPCS | Performed by: INTERNAL MEDICINE

## 2018-10-10 PROCEDURE — G8417 CALC BMI ABV UP PARAM F/U: HCPCS | Performed by: INTERNAL MEDICINE

## 2018-10-10 PROCEDURE — G8484 FLU IMMUNIZE NO ADMIN: HCPCS | Performed by: INTERNAL MEDICINE

## 2018-10-10 PROCEDURE — G8400 PT W/DXA NO RESULTS DOC: HCPCS | Performed by: INTERNAL MEDICINE

## 2018-10-10 PROCEDURE — 4040F PNEUMOC VAC/ADMIN/RCVD: CPT | Performed by: INTERNAL MEDICINE

## 2018-10-10 PROCEDURE — 1101F PT FALLS ASSESS-DOCD LE1/YR: CPT | Performed by: INTERNAL MEDICINE

## 2018-10-10 PROCEDURE — 93000 ELECTROCARDIOGRAM COMPLETE: CPT | Performed by: INTERNAL MEDICINE

## 2018-10-10 PROCEDURE — 1036F TOBACCO NON-USER: CPT | Performed by: INTERNAL MEDICINE

## 2018-10-10 PROCEDURE — 1090F PRES/ABSN URINE INCON ASSESS: CPT | Performed by: INTERNAL MEDICINE

## 2018-10-10 PROCEDURE — 3017F COLORECTAL CA SCREEN DOC REV: CPT | Performed by: INTERNAL MEDICINE

## 2018-10-10 PROCEDURE — 99213 OFFICE O/P EST LOW 20 MIN: CPT | Performed by: INTERNAL MEDICINE

## 2018-10-10 PROCEDURE — 1123F ACP DISCUSS/DSCN MKR DOCD: CPT | Performed by: INTERNAL MEDICINE

## 2018-10-10 ASSESSMENT — ENCOUNTER SYMPTOMS
BACK PAIN: 0
SHORTNESS OF BREATH: 0
CONSTIPATION: 0
SORE THROAT: 0
DIARRHEA: 0
COUGH: 0
VOMITING: 0
RIGHT EYE: 0
NAUSEA: 0
DOUBLE VISION: 0
LEFT EYE: 0
ABDOMINAL PAIN: 0
BLURRED VISION: 0
WHEEZING: 0

## 2018-10-10 NOTE — PROGRESS NOTES
ear normal. No decreased hearing is noted. Nose: Nose normal. No sinus tenderness. No epistaxis. Mouth/Throat: Oropharynx is clear and moist. Mucous membranes are not dry. No oropharyngeal exudate. Eyes: Pupils are equal, round, and reactive to light. Conjunctivae and EOM are normal. Right eye exhibits no discharge. Left eye exhibits no discharge. Neck: Trachea normal. Neck supple. No JVD present. No edema present. No thyroid mass present. Cardiovascular: Normal rate, regular rhythm, normal heart sounds and normal pulses. No extrasystoles are present. Pulmonary/Chest: Effort normal and breath sounds normal. She exhibits no tenderness. Breasts are symmetrical.   Abdominal: Soft. Normal appearance and bowel sounds are normal. She exhibits no mass. There is no hepatosplenomegaly. There is no tenderness. No hernia. Musculoskeletal: Normal range of motion. Right shoulder: Normal. She exhibits normal range of motion and no swelling. Left shoulder: Normal. She exhibits normal range of motion and no swelling. Right hip: Normal. She exhibits normal range of motion and no swelling. Left hip: Normal. She exhibits normal range of motion and no swelling. Right knee: Normal. She exhibits normal range of motion and no swelling. Left knee: Normal. She exhibits normal range of motion and no swelling. Right upper arm: Normal.        Left upper arm: Normal.        Right upper leg: Normal.        Left upper leg: Normal.        Right lower leg: Normal.        Left lower leg: Normal.   Lymphadenopathy:        Head (right side): No submandibular adenopathy present. Head (left side): No submandibular adenopathy present. Neurological: She is alert and oriented to person, place, and time. She has normal strength. She displays normal reflexes. No cranial nerve deficit or sensory deficit. Coordination and gait normal.   Skin: Skin is warm and dry.  No lesion and no rash

## 2019-01-16 ENCOUNTER — TELEPHONE (OUTPATIENT)
Dept: CARDIOLOGY CLINIC | Age: 64
End: 2019-01-16

## 2019-01-16 ENCOUNTER — OFFICE VISIT (OUTPATIENT)
Dept: CARDIOLOGY CLINIC | Age: 64
End: 2019-01-16
Payer: MEDICARE

## 2019-01-16 VITALS
HEIGHT: 67 IN | BODY MASS INDEX: 32.33 KG/M2 | WEIGHT: 206 LBS | DIASTOLIC BLOOD PRESSURE: 83 MMHG | SYSTOLIC BLOOD PRESSURE: 121 MMHG

## 2019-01-16 DIAGNOSIS — R07.9 CHEST PAIN, UNSPECIFIED TYPE: ICD-10-CM

## 2019-01-16 DIAGNOSIS — I48.0 PAROXYSMAL ATRIAL FIBRILLATION (HCC): Primary | ICD-10-CM

## 2019-01-16 PROCEDURE — 1101F PT FALLS ASSESS-DOCD LE1/YR: CPT | Performed by: INTERNAL MEDICINE

## 2019-01-16 PROCEDURE — 93000 ELECTROCARDIOGRAM COMPLETE: CPT | Performed by: INTERNAL MEDICINE

## 2019-01-16 PROCEDURE — G8427 DOCREV CUR MEDS BY ELIG CLIN: HCPCS | Performed by: INTERNAL MEDICINE

## 2019-01-16 PROCEDURE — 4040F PNEUMOC VAC/ADMIN/RCVD: CPT | Performed by: INTERNAL MEDICINE

## 2019-01-16 PROCEDURE — 99214 OFFICE O/P EST MOD 30 MIN: CPT | Performed by: INTERNAL MEDICINE

## 2019-01-16 PROCEDURE — 3017F COLORECTAL CA SCREEN DOC REV: CPT | Performed by: INTERNAL MEDICINE

## 2019-01-16 PROCEDURE — 1090F PRES/ABSN URINE INCON ASSESS: CPT | Performed by: INTERNAL MEDICINE

## 2019-01-16 PROCEDURE — G8400 PT W/DXA NO RESULTS DOC: HCPCS | Performed by: INTERNAL MEDICINE

## 2019-01-16 PROCEDURE — G8484 FLU IMMUNIZE NO ADMIN: HCPCS | Performed by: INTERNAL MEDICINE

## 2019-01-16 PROCEDURE — G8417 CALC BMI ABV UP PARAM F/U: HCPCS | Performed by: INTERNAL MEDICINE

## 2019-01-16 PROCEDURE — 1036F TOBACCO NON-USER: CPT | Performed by: INTERNAL MEDICINE

## 2019-01-16 PROCEDURE — 1123F ACP DISCUSS/DSCN MKR DOCD: CPT | Performed by: INTERNAL MEDICINE

## 2019-01-16 ASSESSMENT — ENCOUNTER SYMPTOMS
DOUBLE VISION: 0
SHORTNESS OF BREATH: 0
BLURRED VISION: 0
SORE THROAT: 0
VOMITING: 0
ABDOMINAL PAIN: 0
DIARRHEA: 0
NAUSEA: 0
LEFT EYE: 0
CONSTIPATION: 0
COUGH: 0
BACK PAIN: 0
RIGHT EYE: 0
WHEEZING: 0

## 2019-02-06 ENCOUNTER — TELEPHONE (OUTPATIENT)
Dept: CARDIOLOGY CLINIC | Age: 64
End: 2019-02-06

## 2019-03-07 ENCOUNTER — TELEPHONE (OUTPATIENT)
Dept: CARDIOLOGY CLINIC | Age: 64
End: 2019-03-07

## 2019-03-14 ENCOUNTER — OFFICE VISIT (OUTPATIENT)
Dept: CARDIOLOGY CLINIC | Age: 64
End: 2019-03-14
Payer: MEDICARE

## 2019-03-14 VITALS
HEART RATE: 85 BPM | BODY MASS INDEX: 32.49 KG/M2 | DIASTOLIC BLOOD PRESSURE: 85 MMHG | WEIGHT: 207 LBS | SYSTOLIC BLOOD PRESSURE: 128 MMHG | HEIGHT: 67 IN

## 2019-03-14 DIAGNOSIS — I48.0 PAROXYSMAL ATRIAL FIBRILLATION (HCC): Primary | ICD-10-CM

## 2019-03-14 PROCEDURE — 4040F PNEUMOC VAC/ADMIN/RCVD: CPT | Performed by: INTERNAL MEDICINE

## 2019-03-14 PROCEDURE — G8417 CALC BMI ABV UP PARAM F/U: HCPCS | Performed by: INTERNAL MEDICINE

## 2019-03-14 PROCEDURE — 1090F PRES/ABSN URINE INCON ASSESS: CPT | Performed by: INTERNAL MEDICINE

## 2019-03-14 PROCEDURE — G8484 FLU IMMUNIZE NO ADMIN: HCPCS | Performed by: INTERNAL MEDICINE

## 2019-03-14 PROCEDURE — G8400 PT W/DXA NO RESULTS DOC: HCPCS | Performed by: INTERNAL MEDICINE

## 2019-03-14 PROCEDURE — 99213 OFFICE O/P EST LOW 20 MIN: CPT | Performed by: INTERNAL MEDICINE

## 2019-03-14 PROCEDURE — 93000 ELECTROCARDIOGRAM COMPLETE: CPT | Performed by: INTERNAL MEDICINE

## 2019-03-14 PROCEDURE — 1123F ACP DISCUSS/DSCN MKR DOCD: CPT | Performed by: INTERNAL MEDICINE

## 2019-03-14 PROCEDURE — 1036F TOBACCO NON-USER: CPT | Performed by: INTERNAL MEDICINE

## 2019-03-14 PROCEDURE — 1101F PT FALLS ASSESS-DOCD LE1/YR: CPT | Performed by: INTERNAL MEDICINE

## 2019-03-14 PROCEDURE — 3017F COLORECTAL CA SCREEN DOC REV: CPT | Performed by: INTERNAL MEDICINE

## 2019-03-14 PROCEDURE — G8428 CUR MEDS NOT DOCUMENT: HCPCS | Performed by: INTERNAL MEDICINE

## 2019-03-14 ASSESSMENT — ENCOUNTER SYMPTOMS
LEFT EYE: 0
RIGHT EYE: 0
ABDOMINAL PAIN: 0
SHORTNESS OF BREATH: 0
SORE THROAT: 0
VOMITING: 0
BLURRED VISION: 0
DOUBLE VISION: 0
BACK PAIN: 0
CONSTIPATION: 0
NAUSEA: 0
COUGH: 0
WHEEZING: 0
DIARRHEA: 0

## 2019-06-26 ENCOUNTER — OFFICE VISIT (OUTPATIENT)
Dept: INTERNAL MEDICINE CLINIC | Age: 64
End: 2019-06-26
Payer: MEDICARE

## 2019-06-26 VITALS
RESPIRATION RATE: 14 BRPM | BODY MASS INDEX: 31.16 KG/M2 | DIASTOLIC BLOOD PRESSURE: 77 MMHG | HEIGHT: 67 IN | WEIGHT: 198.5 LBS | SYSTOLIC BLOOD PRESSURE: 134 MMHG | HEART RATE: 77 BPM

## 2019-06-26 DIAGNOSIS — E03.9 HYPOTHYROIDISM, UNSPECIFIED TYPE: Primary | ICD-10-CM

## 2019-06-26 DIAGNOSIS — E55.9 VITAMIN D DEFICIENCY: ICD-10-CM

## 2019-06-26 PROCEDURE — G8417 CALC BMI ABV UP PARAM F/U: HCPCS | Performed by: NURSE PRACTITIONER

## 2019-06-26 PROCEDURE — 1090F PRES/ABSN URINE INCON ASSESS: CPT | Performed by: NURSE PRACTITIONER

## 2019-06-26 PROCEDURE — 3017F COLORECTAL CA SCREEN DOC REV: CPT | Performed by: NURSE PRACTITIONER

## 2019-06-26 PROCEDURE — G8427 DOCREV CUR MEDS BY ELIG CLIN: HCPCS | Performed by: NURSE PRACTITIONER

## 2019-06-26 PROCEDURE — 1123F ACP DISCUSS/DSCN MKR DOCD: CPT | Performed by: NURSE PRACTITIONER

## 2019-06-26 PROCEDURE — 1036F TOBACCO NON-USER: CPT | Performed by: NURSE PRACTITIONER

## 2019-06-26 PROCEDURE — 4040F PNEUMOC VAC/ADMIN/RCVD: CPT | Performed by: NURSE PRACTITIONER

## 2019-06-26 PROCEDURE — G8400 PT W/DXA NO RESULTS DOC: HCPCS | Performed by: NURSE PRACTITIONER

## 2019-06-26 PROCEDURE — 99214 OFFICE O/P EST MOD 30 MIN: CPT | Performed by: NURSE PRACTITIONER

## 2019-06-26 NOTE — PROGRESS NOTES
[address] [facility]  [address] [place] [address]  [address]  [address]  Dept: [phone]  Dept Fax: [phone] : [phone]     Visit Date:  6/26/2019    Patient:  Quirino Scott  YOB: 1955    HPI:     Chief Complaint   Patient presents with    Hypothyroidism    Other     Vitamin D def    Results     Valentin Sim presents today for medical evaluation of hypothyroidism and vitamin D deficiency. She was last seen by Dr. Woodrow Rothman 1 year ago. She follows routinely with Dr. Klaudia Milian. Hypothyroidism-  She denies any fatigue, cold/heat intolerance, hoarseness of voice, does have fullness in throat, dysphagia, no dysphonia, sandiness of eyes, hair loss or weight changes. Energy levels are stable. She is taking her Synthroid 88 mcg with water at least 30 minutes prior to eating and 4 hours from any multivitamin/supplements. Last TSH was 2.66 and Free T4 1.53 in 7/2018. Last thyroid US was 2012. Vitamin D Deficiency-  Last Vitamin D level in 5/2018 was 39 indicating a sufficient balance. She takes a multivitamin daily. Following for Dr. Samuel Boyce for atrial fibrillation. Ablation with cryotherapy 8/6/18.         Medications    Current Outpatient Medications:     Fexofenadine HCl (ALLEGRA PO), Take by mouth, Disp: , Rfl:     budesonide-formoterol (SYMBICORT) 80-4.5 MCG/ACT AERO, Inhale 2 puffs into the lungs 2 times daily, Disp: , Rfl:     levalbuterol (XOPENEX HFA) 45 MCG/ACT inhaler, Inhale 1-2 puffs into the lungs every 4 hours as needed for Wheezing or Shortness of Breath, Disp: 1 Inhaler, Rfl: 5    magnesium (MAGNESIUM-OXIDE) 250 MG TABS tablet, Take 250 mg by mouth daily, Disp: , Rfl:     Multiple Vitamins-Minerals (THERAPEUTIC MULTIVITAMIN-MINERALS) tablet, Take 1 tablet by mouth daily, Disp: , Rfl:     polyethylene glycol (GLYCOLAX) powder, Take 17 g by mouth daily, Disp: , Rfl:     levothyroxine (SYNTHROID) 88 MCG tablet,

## 2019-07-03 ENCOUNTER — TELEPHONE (OUTPATIENT)
Dept: INTERNAL MEDICINE CLINIC | Age: 64
End: 2019-07-03

## 2019-07-03 DIAGNOSIS — E03.9 HYPOTHYROIDISM, UNSPECIFIED TYPE: ICD-10-CM

## 2019-07-03 RX ORDER — LEVOTHYROXINE SODIUM 88 MCG
88 TABLET ORAL DAILY
Qty: 90 TABLET | Refills: 1 | OUTPATIENT
Start: 2019-07-03

## 2019-07-03 RX ORDER — LEVOTHYROXINE SODIUM 88 UG/1
100 TABLET ORAL DAILY
Qty: 90 TABLET | Refills: 3 | Status: CANCELLED | OUTPATIENT
Start: 2019-07-03

## 2019-07-03 NOTE — TELEPHONE ENCOUNTER
Mk Adams called requesting a refill on the following medications:  Requested Prescriptions     Pending Prescriptions Disp Refills    levothyroxine (SYNTHROID) 88 MCG tablet 90 tablet 3     Sig: Take 1 tablet by mouth Daily     Pharm[facility]: [facility]      Date of last visit: 6/26/19  Date of next visit (if applicable): Visit date not found

## 2019-07-12 DIAGNOSIS — E03.9 HYPOTHYROIDISM, UNSPECIFIED TYPE: ICD-10-CM

## 2019-07-15 RX ORDER — LEVOTHYROXINE SODIUM 88 UG/1
100 TABLET ORAL DAILY
Qty: 90 TABLET | Refills: 1 | Status: SHIPPED | OUTPATIENT
Start: 2019-07-15 | End: 2020-01-13

## 2019-07-18 ENCOUNTER — TELEPHONE (OUTPATIENT)
Dept: INTERNAL MEDICINE CLINIC | Age: 64
End: 2019-07-18

## 2019-07-24 ASSESSMENT — ENCOUNTER SYMPTOMS
VOMITING: 0
CONSTIPATION: 0
NAUSEA: 0
SORE THROAT: 0
SINUS PRESSURE: 0
PHOTOPHOBIA: 0
ABDOMINAL DISTENTION: 0
BLOOD IN STOOL: 0
ABDOMINAL PAIN: 0
DIARRHEA: 0
SHORTNESS OF BREATH: 0
WHEEZING: 0
SINUS PAIN: 0
TROUBLE SWALLOWING: 0
COUGH: 0

## 2019-07-30 DIAGNOSIS — J45.20 MILD INTERMITTENT ASTHMA WITHOUT COMPLICATION: ICD-10-CM

## 2019-08-02 ENCOUNTER — OFFICE VISIT (OUTPATIENT)
Dept: PULMONOLOGY | Age: 64
End: 2019-08-02
Payer: MEDICARE

## 2019-08-02 VITALS
WEIGHT: 200.8 LBS | HEIGHT: 67 IN | OXYGEN SATURATION: 97 % | HEART RATE: 79 BPM | TEMPERATURE: 97.9 F | SYSTOLIC BLOOD PRESSURE: 124 MMHG | DIASTOLIC BLOOD PRESSURE: 70 MMHG | BODY MASS INDEX: 31.52 KG/M2

## 2019-08-02 DIAGNOSIS — I48.91 ATRIAL FIBRILLATION, UNSPECIFIED TYPE (HCC): ICD-10-CM

## 2019-08-02 DIAGNOSIS — J45.20 MILD INTERMITTENT ASTHMA WITHOUT COMPLICATION: Primary | ICD-10-CM

## 2019-08-02 PROCEDURE — 4040F PNEUMOC VAC/ADMIN/RCVD: CPT | Performed by: NURSE PRACTITIONER

## 2019-08-02 PROCEDURE — 99213 OFFICE O/P EST LOW 20 MIN: CPT | Performed by: NURSE PRACTITIONER

## 2019-08-02 PROCEDURE — 1123F ACP DISCUSS/DSCN MKR DOCD: CPT | Performed by: NURSE PRACTITIONER

## 2019-08-02 PROCEDURE — G8427 DOCREV CUR MEDS BY ELIG CLIN: HCPCS | Performed by: NURSE PRACTITIONER

## 2019-08-02 PROCEDURE — 1090F PRES/ABSN URINE INCON ASSESS: CPT | Performed by: NURSE PRACTITIONER

## 2019-08-02 PROCEDURE — 1036F TOBACCO NON-USER: CPT | Performed by: NURSE PRACTITIONER

## 2019-08-02 PROCEDURE — 3017F COLORECTAL CA SCREEN DOC REV: CPT | Performed by: NURSE PRACTITIONER

## 2019-08-02 PROCEDURE — G8417 CALC BMI ABV UP PARAM F/U: HCPCS | Performed by: NURSE PRACTITIONER

## 2019-08-02 PROCEDURE — G8400 PT W/DXA NO RESULTS DOC: HCPCS | Performed by: NURSE PRACTITIONER

## 2019-08-02 RX ORDER — BUDESONIDE AND FORMOTEROL FUMARATE DIHYDRATE 80; 4.5 UG/1; UG/1
2 AEROSOL RESPIRATORY (INHALATION) 2 TIMES DAILY
Qty: 3 INHALER | Refills: 3 | Status: SHIPPED | OUTPATIENT
Start: 2019-08-02 | End: 2020-08-07 | Stop reason: ALTCHOICE

## 2019-08-02 ASSESSMENT — ENCOUNTER SYMPTOMS
WHEEZING: 0
SHORTNESS OF BREATH: 0
COUGH: 0
DIARRHEA: 0
EYES NEGATIVE: 1
ABDOMINAL PAIN: 0
NAUSEA: 0
VOMITING: 0

## 2019-09-18 ENCOUNTER — TELEPHONE (OUTPATIENT)
Dept: CARDIOLOGY CLINIC | Age: 64
End: 2019-09-18

## 2019-09-18 ENCOUNTER — OFFICE VISIT (OUTPATIENT)
Dept: CARDIOLOGY CLINIC | Age: 64
End: 2019-09-18
Payer: MEDICARE

## 2019-09-18 VITALS
SYSTOLIC BLOOD PRESSURE: 117 MMHG | HEIGHT: 67 IN | HEART RATE: 76 BPM | BODY MASS INDEX: 31.55 KG/M2 | WEIGHT: 201 LBS | DIASTOLIC BLOOD PRESSURE: 72 MMHG

## 2019-09-18 DIAGNOSIS — R07.9 CHEST PAIN, UNSPECIFIED TYPE: Primary | ICD-10-CM

## 2019-09-18 DIAGNOSIS — I48.0 PAROXYSMAL ATRIAL FIBRILLATION (HCC): ICD-10-CM

## 2019-09-18 PROCEDURE — G8400 PT W/DXA NO RESULTS DOC: HCPCS | Performed by: INTERNAL MEDICINE

## 2019-09-18 PROCEDURE — 1090F PRES/ABSN URINE INCON ASSESS: CPT | Performed by: INTERNAL MEDICINE

## 2019-09-18 PROCEDURE — 3017F COLORECTAL CA SCREEN DOC REV: CPT | Performed by: INTERNAL MEDICINE

## 2019-09-18 PROCEDURE — 1123F ACP DISCUSS/DSCN MKR DOCD: CPT | Performed by: INTERNAL MEDICINE

## 2019-09-18 PROCEDURE — 1036F TOBACCO NON-USER: CPT | Performed by: INTERNAL MEDICINE

## 2019-09-18 PROCEDURE — G8417 CALC BMI ABV UP PARAM F/U: HCPCS | Performed by: INTERNAL MEDICINE

## 2019-09-18 PROCEDURE — 99214 OFFICE O/P EST MOD 30 MIN: CPT | Performed by: INTERNAL MEDICINE

## 2019-09-18 PROCEDURE — G8428 CUR MEDS NOT DOCUMENT: HCPCS | Performed by: INTERNAL MEDICINE

## 2019-09-18 PROCEDURE — 4040F PNEUMOC VAC/ADMIN/RCVD: CPT | Performed by: INTERNAL MEDICINE

## 2019-09-18 PROCEDURE — 93000 ELECTROCARDIOGRAM COMPLETE: CPT | Performed by: INTERNAL MEDICINE

## 2019-09-18 ASSESSMENT — ENCOUNTER SYMPTOMS
SHORTNESS OF BREATH: 0
NAUSEA: 0
DOUBLE VISION: 0
ABDOMINAL PAIN: 0
COUGH: 0
RIGHT EYE: 0
VOMITING: 0
BLURRED VISION: 0
SORE THROAT: 0
LEFT EYE: 0
BACK PAIN: 0
DIARRHEA: 0
CONSTIPATION: 0
WHEEZING: 0

## 2019-09-18 NOTE — PROGRESS NOTES
EKG obtained     Patient is here for a 6 mo f/u s/p 8.2.2018    Patient still has a c/o of chest discomfort. She did have the hiatal hernia surgery April 2019.    Patient denies SOB, light headed, dizziness, palpitations
member of club or organization: None     Attends meetings of clubs or organizations: None     Relationship status: None    Intimate partner violence:     Fear of current or ex partner: None     Emotionally abused: None     Physically abused: None     Forced sexual activity: None   Other Topics Concern    None   Social History Narrative    None       Medications:   Scheduled Meds:  Continuous Infusions:   CBC: No results for input(s): WBC, HGB, PLT in the last 72 hours. BMP:  No results for input(s): NA, K, CL, CO2, BUN, CREATININE, GLUCOSE in the last 72 hours. Hepatic: No results for input(s): AST, ALT, ALB, BILITOT, ALKPHOS in the last 72 hours. Troponin: No results for input(s): TROPONINI in the last 72 hours. BNP: No results for input(s): BNP in the last 72 hours. Lipids: No results for input(s): CHOL, HDL in the last 72 hours. Invalid input(s): LDLCALCU  INR: No results for input(s): INR in the last 72 hours. Objective:   REVIEW OF SYSTEMS:    Review of Systems   Constitution: Negative for fever, weight gain and weight loss. HENT: Negative for hearing loss and sore throat. Eyes: Negative for blurred vision, double vision, vision loss in left eye and vision loss in right eye. Cardiovascular: Positive for chest pain. Negative for dyspnea on exertion, irregular heartbeat, near-syncope, palpitations and syncope. Respiratory: Negative for cough, shortness of breath and wheezing. Endocrine: Negative for cold intolerance, heat intolerance and polyuria. Hematologic/Lymphatic: Negative for bleeding problem. Does not bruise/bleed easily. Skin: Negative for dry skin, itching and rash. Musculoskeletal: Negative for arthritis, back pain, joint pain and myalgias. Gastrointestinal: Negative for abdominal pain, constipation, diarrhea, nausea and vomiting. Genitourinary: Negative for dysuria, frequency, hematuria and urgency.    Neurological: Negative for dizziness, headaches,

## 2019-10-03 ENCOUNTER — TELEPHONE (OUTPATIENT)
Dept: CARDIOLOGY CLINIC | Age: 64
End: 2019-10-03

## 2020-01-13 RX ORDER — LEVOTHYROXINE SODIUM 88 MCG
TABLET ORAL
Qty: 90 TABLET | Refills: 1 | Status: SHIPPED | OUTPATIENT
Start: 2020-01-13 | End: 2020-07-06 | Stop reason: SDUPTHER

## 2020-06-04 ENCOUNTER — TELEPHONE (OUTPATIENT)
Dept: INTERNAL MEDICINE CLINIC | Age: 65
End: 2020-06-04

## 2020-06-16 ENCOUNTER — TELEPHONE (OUTPATIENT)
Dept: INTERNAL MEDICINE CLINIC | Age: 65
End: 2020-06-16

## 2020-06-18 ENCOUNTER — TELEPHONE (OUTPATIENT)
Dept: INTERNAL MEDICINE CLINIC | Age: 65
End: 2020-06-18

## 2020-07-06 ENCOUNTER — OFFICE VISIT (OUTPATIENT)
Dept: INTERNAL MEDICINE CLINIC | Age: 65
End: 2020-07-06
Payer: COMMERCIAL

## 2020-07-06 VITALS
HEIGHT: 67 IN | DIASTOLIC BLOOD PRESSURE: 70 MMHG | HEART RATE: 72 BPM | BODY MASS INDEX: 32.18 KG/M2 | TEMPERATURE: 98.4 F | SYSTOLIC BLOOD PRESSURE: 116 MMHG | WEIGHT: 205 LBS

## 2020-07-06 PROCEDURE — 99214 OFFICE O/P EST MOD 30 MIN: CPT | Performed by: NURSE PRACTITIONER

## 2020-07-06 RX ORDER — LEVOTHYROXINE SODIUM 88 MCG
TABLET ORAL
Qty: 90 TABLET | Refills: 3 | Status: SHIPPED | OUTPATIENT
Start: 2020-07-06 | End: 2021-04-05 | Stop reason: SDUPTHER

## 2020-07-06 ASSESSMENT — PATIENT HEALTH QUESTIONNAIRE - PHQ9
SUM OF ALL RESPONSES TO PHQ9 QUESTIONS 1 & 2: 0
SUM OF ALL RESPONSES TO PHQ QUESTIONS 1-9: 0
1. LITTLE INTEREST OR PLEASURE IN DOING THINGS: 0
SUM OF ALL RESPONSES TO PHQ QUESTIONS 1-9: 0
2. FEELING DOWN, DEPRESSED OR HOPELESS: 0

## 2020-07-06 NOTE — PATIENT INSTRUCTIONS
[facility]  [address], [address] [facility]   [phone]      [facility]

## 2020-07-06 NOTE — PROGRESS NOTES
2020     Derek Scott (:  1955) is a 77 y.o. female, here for evaluation of the following medical concerns: hypothyroidism and vitamin D deficiency. I last seen Felix Ventura 1 year ago. She follows routinely with Dr. Cyndy Manjarrez. No ER visits or hospitalizations since last visit.     Hypothyroidism-  She denies any fatigue, cold/heat intolerance, hoarseness of voice, does have fullness in throat, dysphagia, no dysphonia, sandiness of eyes, hair loss or weight changes. Energy levels are stable. She is taking her Synthroid 88 mcg with water at least 30 minutes prior to eating and 4 hours from any multivitamin/supplements. Last TSH was 0.86 on 2020. Last thyroid US was 2019.     Vitamin D Deficiency-  Last Vitamin D level in 2018 was 39 indicating a sufficient balance. She takes a multivitamin daily.      Following for Dr. Verma Comment for atrial fibrillation. Ablation with cryotherapy 18.          Right rotator cuff repair in Oct 2019 with [address]- still hurting- bicep right injection- second opinion from dr aCta Calderon. 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 HENT: Negative for congestion, sinus pressure, sinus pain, sore throat, tinnitus and trouble swallowing. Eyes: Negative for photophobia and visual disturbance. Respiratory: Negative for cough, shortness of breath and wheezing. Cardiovascular: Negative for chest pain, palpitations and leg swelling. Gastrointestinal: Negative for abdominal distention, abdominal pain, blood in stool, constipation, diarrhea, nausea and vomiting. Endocrine: Negative for cold intolerance and heat intolerance. Genitourinary: Negative for difficulty urinating, dysuria and hematuria. Musculoskeletal: Negative for arthralgias and myalgias.    Skin: Negative for rash and wound. Neurological: Negative for dizziness, light-headedness and headaches. Psychiatric/Behavioral: Negative for dysphoric mood and sleep disturbance. The patient is not nervous/anxious. Prior to Visit Medications    Medication Sig Taking? Authorizing Provider   SYNTHROID 88 MCG tablet TAKE 1 TABLET DAILY Yes MYLA Michaud CNP   Probiotic Product (PROBIOTIC-10) CAPS Take 1 capsule by mouth daily Yes Historical Provider, MD   budesonide-formoterol (SYMBICORT) 80-4.5 MCG/ACT AERO Inhale 2 puffs into the lungs 2 times daily Yes MYLA Salamanca CNP   Fexofenadine HCl (ALLEGRA PO) Take by mouth Yes Historical Provider, MD   levalbuterol (XOPENEX HFA) 45 MCG/ACT inhaler Inhale 1-2 puffs into the lungs every 4 hours as needed for Wheezing or Shortness of Breath Yes MYLA Salamanca CNP   magnesium (MAGNESIUM-OXIDE) 250 MG TABS tablet Take 250 mg by mouth daily Yes Historical Provider, MD   Multiple Vitamins-Minerals (THERAPEUTIC MULTIVITAMIN-MINERALS) tablet Take 1 tablet by mouth daily Yes Historical Provider, MD   polyethylene glycol (GLYCOLAX) powder Take 17 g by mouth daily Yes Historical Provider, MD        Social History     Tobacco Use    Smoking status: Passive Smoke Exposure - Never Smoker    Smokeless tobacco: Never Used   Substance Use Topics    Alcohol use: Yes     Alcohol/week: 0.0 standard drinks     Comment: occ        Vitals:    07/06/20 1359   BP: 116/70   Site: Right Upper Arm   Cuff Size: Medium Adult   Pulse: 72   Temp: 98.4 °F (36.9 °C)   TempSrc: Temporal   Weight: 205 lb (93 kg)   Height: 5' 7\" (1.702 m)     Estimated body mass index is 32.11 kg/m² as calculated from the following:    Height as of this encounter: 5' 7\" (1.702 m). Weight as of this encounter: 205 lb (93 kg). Physical Exam  Constitutional:       General: She is not in acute distress. Appearance: She is well-developed. HENT:      Head: Normocephalic and atraumatic. Right Ear: External ear normal.      Left Ear: External ear normal.      Nose: Nose normal.   Eyes:      Conjunctiva/sclera: Conjunctivae normal.      Pupils: Pupils are equal, round, and reactive to light. Neck:      Musculoskeletal: Normal range of motion and neck supple. Thyroid: No thyromegaly. Vascular: No JVD. Cardiovascular:      Rate and Rhythm: Normal rate and regular rhythm. Heart sounds: Normal heart sounds. No murmur. No friction rub. No gallop. Pulmonary:      Effort: Pulmonary effort is normal. No respiratory distress. Breath sounds: Normal breath sounds. No wheezing or rales. Abdominal:      General: Bowel sounds are normal. There is no distension. Palpations: Abdomen is soft. Tenderness: There is no abdominal tenderness. Musculoskeletal: Normal range of motion. Lymphadenopathy:      Cervical: No cervical adenopathy. Skin:     General: Skin is warm and dry. Capillary Refill: Capillary refill takes less than 2 seconds. Findings: No erythema or rash. Neurological:      Mental Status: She is alert and oriented to person, place, and time. Psychiatric:         Behavior: Behavior normal.         Thought Content: Thought content normal.         Judgment: Judgment normal.         ASSESSMENT/PLAN:    1. Hypothyroidism, unspecified type    - SYNTHROID 88 MCG tablet; TAKE 1 TABLET DAILY  Dispense: 90 tablet; Refill: 3  - Thyroglobulin; Future  - TSH With Reflex Ft4; Future    2. Vitamin D deficiency    - Continue multivitamin      Return in about 1 year (around 7/6/2021). An electronic signature was used to authenticate this note.     --MYLA Chan - CNP on 7/22/2020 at 12:34 PM

## 2020-07-22 ASSESSMENT — ENCOUNTER SYMPTOMS
SINUS PRESSURE: 0
SINUS PAIN: 0
SORE THROAT: 0
NAUSEA: 0
COUGH: 0
DIARRHEA: 0
SHORTNESS OF BREATH: 0
WHEEZING: 0
ABDOMINAL DISTENTION: 0
BLOOD IN STOOL: 0
TROUBLE SWALLOWING: 0
VOMITING: 0
CONSTIPATION: 0
ABDOMINAL PAIN: 0
PHOTOPHOBIA: 0

## 2020-08-07 ENCOUNTER — OFFICE VISIT (OUTPATIENT)
Dept: PULMONOLOGY | Age: 65
End: 2020-08-07
Payer: COMMERCIAL

## 2020-08-07 VITALS
TEMPERATURE: 98.6 F | WEIGHT: 203 LBS | HEIGHT: 67 IN | OXYGEN SATURATION: 95 % | BODY MASS INDEX: 31.86 KG/M2 | DIASTOLIC BLOOD PRESSURE: 74 MMHG | HEART RATE: 94 BPM | SYSTOLIC BLOOD PRESSURE: 108 MMHG

## 2020-08-07 PROCEDURE — 99214 OFFICE O/P EST MOD 30 MIN: CPT | Performed by: NURSE PRACTITIONER

## 2020-08-07 RX ORDER — BUDESONIDE AND FORMOTEROL FUMARATE DIHYDRATE 160; 4.5 UG/1; UG/1
2 AEROSOL RESPIRATORY (INHALATION) 2 TIMES DAILY
Qty: 3 INHALER | Refills: 3 | Status: SHIPPED | OUTPATIENT
Start: 2020-08-07 | End: 2022-03-04 | Stop reason: ALTCHOICE

## 2020-08-07 RX ORDER — GUAIFENESIN 600 MG/1
600 TABLET, EXTENDED RELEASE ORAL 2 TIMES DAILY
Qty: 180 TABLET | Refills: 1 | Status: SHIPPED | OUTPATIENT
Start: 2020-08-07 | End: 2020-09-06

## 2020-08-07 ASSESSMENT — ENCOUNTER SYMPTOMS
NAUSEA: 0
SINUS PRESSURE: 0
VOICE CHANGE: 0
EYES NEGATIVE: 1
WHEEZING: 0
SHORTNESS OF BREATH: 0
VOMITING: 0
CHEST TIGHTNESS: 0
SORE THROAT: 0
COUGH: 1
ABDOMINAL PAIN: 0
DIARRHEA: 0
TROUBLE SWALLOWING: 0

## 2020-08-07 NOTE — PROGRESS NOTES
[facility]     Patient: Jose Dear, 77 y.o.   : 1955    Pt of Dr. Carroll Warren   Patient presents with    Follow-up     asthma 1 yr f/u no test        HPI  Jesus Herbert is here for 1 year follow up for asthma  In Feb had increased cough with phlegm, saw PCP and was on several rounds of atbx, steroids and increased use of nebulizer. The cough did improve, but continues to have feeling of stuck phlegm in throat. When coughs it can occur any time of day. Known triggers: some  and pine. Seems to get this respiratory \"junk\" every March    No animals in house. Carpet in living room and bedrooms. On Symbicort 80/4.5 mcg using with good compliance.   Has not used her rescue inhaler or nebs since March- avoids as much as poss due to underlying Afib   Past Medical hx   PMH:  Past Medical History:   Diagnosis Date    Anemia     Arthritis     general    Asthma     Atrial fibrillation ([address]     Backache     Colitis     Disease of blood and blood forming organ     anemia    Eczema     Fibromyalgia     Fibula fracture     GERD (gastroesophageal reflux disease)     hiatal hernia, GERD     Hernia, hiatal 2015    Hypoglycemia     Hypothyroidism     PAF (paroxysmal atrial fibrillation) ([address] 2018    Perianal abscess 2001    Ruptured disk 1996    Torn ligament 2005    right thumb     SURGICAL HISTORY:  Past Surgical History:   Procedure Laterality Date    APPENDECTOMY  1986    BACK SURGERY      BARTHOLIN GLAND CYST EXCISION  2002    CARDIAC SURGERY      fix A Fib    CHOLECYSTECTOMY  2002    COLONOSCOPY  ,,    CYSTOCELE REPAIR  2001, 2001    [address] HERNIA REPAIR  10/2017    HYSTERECTOMY  1986    KNEE SURGERY Right 2016    meniscus repair    PARTIAL HYSTERECTOMY  1986    RECTOCELE REPAIR      STOMACH SURGERY  October    UPPER GASTROINTESTINAL ENDOSCOPY  [id number]    VARICOSE VEIN SURGERY  [id number]     SOCIAL HISTORY:  Social History     Tobacco Use    Smoking status: Passive Smoke Exposure - Never Smoker    Smokeless tobacco: Never Used   Substance Use Topics    Alcohol use: Yes     Alcohol/week: 0.0 standard drinks     Comment: occ    Drug use: No     ALLERGIES:  Allergies   Allergen Reactions    Nexium [Esomeprazole Magnesium] Other (See Comments)     headaches    Cipro Xr Nausea And Vomiting     FAMILY HISTORY:  Family History   Problem Relation Age of Onset   [facility] Migraines Mother     Hypertension Mother     Stroke Mother     Arthritis Father     Cancer Father         lung    Parkinsonism Brother      CURRENT MEDICATIONS:  Current Outpatient Medications   Medication Sig Dispense Refill    budesonide-formoterol (SYMBICORT) 160-4.5 MCG/ACT AERO Inhale 2 puffs into the lungs 2 times daily Rinse mouth after its use. 3 Inhaler 3    guaiFENesin (MUCINEX) 600 MG extended release tablet Take 1 tablet by mouth 2 times daily 180 tablet 1    SYNTHROID 88 MCG tablet TAKE 1 TABLET DAILY 90 tablet 3    Probiotic Product (PROBIOTIC-10) CAPS Take 1 capsule by mouth daily      Fexofenadine HCl (ALLEGRA PO) Take by mouth      levalbuterol (XOPENEX HFA) 45 MCG/ACT inhaler Inhale 1-2 puffs into the lungs every 4 hours as needed for Wheezing or Shortness of Breath 1 Inhaler 5    magnesium (MAGNESIUM-OXIDE) 250 MG TABS tablet Take 250 mg by mouth daily      Multiple Vitamins-Minerals (THERAPEUTIC MULTIVITAMIN-MINERALS) tablet Take 1 tablet by mouth daily      polyethylene glycol (GLYCOLAX) powder Take 17 g by mouth daily       No current facility-administered medications for this visit. Tonye Cogan ROS   Review of Systems   Constitutional: Negative for activity change, chills, fever and unexpected weight change. HENT: Negative.   Negative for congestion, nosebleeds, postnasal drip, sinus pressure, sneezing, sore throat, trouble swallowing and voice Status: She is alert. Psychiatric:         Mood and Affect: Mood normal.         Behavior: Behavior normal.         Thought Content: Thought content normal.         Judgment: Judgment normal.          Test results   Lung Nodule Screening     [] Qualifies    [x]Does not qualify   [] Declined    [] Completed    Assessment      Diagnosis Orders   1. Moderate persistent allergic asthma  RAST GR1    IgE    CBC With Auto Differential   2.  Atrial fibrillation, unspecified type (HCC)     3. Persistent cough for 3 weeks or longer        Plan   -increase Symbicort to 160/4.5 mcg  -try to avoid any excessive use of DONATO due to underlying Afib but ok to use if needed for any increased SOB  -add Mucccinex BID   -get Mini rast/ IgE/ CBC w diff- r/o IGE or eosinophilic phenotype  -monitor for signs of change of color to sputum, fever, body aches and call office if symptoms occur  -avoidance of known triggers     Will see Radha Arriaga in: 2 months with labs     Electronically signed by MYLA Martel CNP on 8/7/2020 at 11:01 AM

## 2020-09-03 ENCOUNTER — TELEPHONE (OUTPATIENT)
Dept: PULMONOLOGY | Age: 65
End: 2020-09-03

## 2020-09-04 NOTE — TELEPHONE ENCOUNTER
Muccinex is the plain muccinex or guaifenesin - do not get muccinex DM     We can add Flonase, this is Over the counter also, can try that first and does well we can try to cover through prescription

## 2020-09-18 ENCOUNTER — OFFICE VISIT (OUTPATIENT)
Dept: PULMONOLOGY | Age: 65
End: 2020-09-18
Payer: COMMERCIAL

## 2020-09-18 VITALS
WEIGHT: 206.4 LBS | HEART RATE: 77 BPM | DIASTOLIC BLOOD PRESSURE: 74 MMHG | OXYGEN SATURATION: 96 % | HEIGHT: 67 IN | SYSTOLIC BLOOD PRESSURE: 124 MMHG | BODY MASS INDEX: 32.39 KG/M2 | TEMPERATURE: 97.4 F

## 2020-09-18 PROCEDURE — 99214 OFFICE O/P EST MOD 30 MIN: CPT | Performed by: NURSE PRACTITIONER

## 2020-09-18 RX ORDER — NAPROXEN SODIUM 220 MG
220 TABLET ORAL 2 TIMES DAILY WITH MEALS
COMMUNITY

## 2020-09-18 RX ORDER — FLUTICASONE PROPIONATE 50 MCG
2 SPRAY, SUSPENSION (ML) NASAL DAILY
Qty: 3 BOTTLE | Refills: 1 | Status: SHIPPED | OUTPATIENT
Start: 2020-09-18

## 2020-09-18 RX ORDER — AMOXICILLIN AND CLAVULANATE POTASSIUM 875; 125 MG/1; MG/1
1 TABLET, FILM COATED ORAL 2 TIMES DAILY
Qty: 20 TABLET | Refills: 0 | Status: SHIPPED | OUTPATIENT
Start: 2020-09-18 | End: 2020-09-28

## 2020-09-18 RX ORDER — PREDNISONE 10 MG/1
10 TABLET ORAL DAILY
Qty: 30 TABLET | Refills: 0 | Status: SHIPPED | OUTPATIENT
Start: 2020-09-18 | End: 2021-01-15

## 2020-09-18 ASSESSMENT — ENCOUNTER SYMPTOMS
COUGH: 0
SINUS PAIN: 1
RHINORRHEA: 0
EYES NEGATIVE: 1
TROUBLE SWALLOWING: 0
SINUS PRESSURE: 1
NAUSEA: 0
VOMITING: 0
VOICE CHANGE: 0
ABDOMINAL PAIN: 0
DIARRHEA: 0
WHEEZING: 0
CHEST TIGHTNESS: 0
SORE THROAT: 1
SHORTNESS OF BREATH: 0

## 2020-09-18 NOTE — PROGRESS NOTES
[facility]     Patient: Jose Dear, 77 y.o.   : 1955    Pt of Dr. Carroll Warren   Patient presents with    Follow-up     Asthma 1 month follow up with labs from         [facility]  Jesus Herbert is here for 1 month follow up for asthma with IgE, CBC w/ diff and Mini rast to review. Frequent right sided sinus congestion and chronic cough. States Feb is always worse time of year for her. Currently c/o pressure and pain to right side of face, right side of neck and pressure in right ear with occasional tinitis. No recent oral steroids or atb  Underlying asthma, on Symbicort 80/4.5 mcg. No current wheezing or SOB  C/o chronic dry eyes, has seen eye doctor many times for this complaint, now on new eye drops nightly that is helping some. Swelling to right side neck lymph nodes, comes and goes. No fever, chills, or myalgias. COVID neg   Had rotator cuff surgery right shoulder 2 weeks ago, this is second surgery on that shoulder. Has seen allergist in distant past, known allergens to pine and grass, states was told the allergen was low   Tried sudafed but caused diarrhea. Taking Allegra daily, but makes dry eye worse.    Tried Flonase in past, can not remember if helpful   Reports seeing Dr Estefanía Be- ENT in past and \" he could not find anything\"  Last PFT 2019- FEV 1 82%   Past Medical hx   PMH:  Past Medical History:   Diagnosis Date    Anemia     Arthritis     general    Asthma     Atrial fibrillation ([address]     Backache     Colitis     Disease of blood and blood forming organ     anemia    Eczema     Fibromyalgia     Fibula fracture     GERD (gastroesophageal reflux disease)     hiatal hernia, GERD     Hernia, hiatal     Hypoglycemia     Hypothyroidism     PAF (paroxysmal atrial fibrillation) ([address] 2018    Perianal abscess 2001    Ruptured disk 1996    Torn ligament 2005    right thumb SURGICAL HISTORY:  Past Surgical History:   Procedure Laterality Date    APPENDECTOMY  1986    BACK SURGERY      BARTHOLIN GLAND CYST EXCISION  2002    CARDIAC SURGERY      fix A Fib    CHOLECYSTECTOMY  2002    COLONOSCOPY  [id number]    CYSTOCELE REPAIR  05/2001, 11/[address]   [address]    HERNIA REPAIR  10/2017    HYSTERECTOMY  1986    KNEE SURGERY Right 03/2016    meniscus repair    PARTIAL HYSTERECTOMY  1986    RECTOCELE REPAIR  2002    ROTATOR CUFF REPAIR Right 09/09/2020   Aleta Parry STOMACH SURGERY  October    UPPER GASTROINTESTINAL ENDOSCOPY  [id number]    VARICOSE VEIN SURGERY  [id number]     SOCIAL HISTORY:  Social History     Tobacco Use    Smoking status: Passive Smoke Exposure - Never Smoker    Smokeless tobacco: Never Used   Substance Use Topics    Alcohol use:  Yes     Alcohol/week: 0.0 standard drinks     Comment: occ    Drug use: No     ALLERGIES:  Allergies   Allergen Reactions    Nexium [Esomeprazole Magnesium] Other (See Comments)     headaches    Cipro Xr Nausea And Vomiting     FAMILY HISTORY:  Family History   Problem Relation Age of Onset   Aleta Parry Migraines Mother     Hypertension Mother     Stroke Mother     Arthritis Father     Cancer Father         lung    Parkinsonism Brother      CURRENT MEDICATIONS:  Current Outpatient Medications   Medication Sig Dispense Refill    naproxen sodium (ALEVE) 220 MG tablet Take 220 mg by mouth 2 times daily (with meals)      amoxicillin-clavulanate (AUGMENTIN) 875-125 MG per tablet Take 1 tablet by mouth 2 times daily for 10 days 20 tablet 0    fluticasone (FLONASE) 50 MCG/ACT nasal spray 2 sprays by Each Nostril route daily 3 Bottle 1    predniSONE (DELTASONE) 10 MG tablet Take 1 tablet by mouth daily 4 tablets x 3 days, 3 tablets daily x 3 days, 2 tablets daily x 3 days, 1 tablet daily x 3 days 30 tablet 0    budesonide-formoterol (SYMBICORT) 160-4.5 MCG/ACT AERO Inhale 2 puffs into the lungs 2 times daily Rinse mouth after its use. 3 Inhaler 3    SYNTHROID 88 MCG tablet TAKE 1 TABLET DAILY 90 tablet 3    Probiotic Product (PROBIOTIC-10) CAPS Take 1 capsule by mouth daily      Fexofenadine HCl (ALLEGRA PO) Take by mouth      levalbuterol (XOPENEX HFA) 45 MCG/ACT inhaler Inhale 1-2 puffs into the lungs every 4 hours as needed for Wheezing or Shortness of Breath 1 Inhaler 5    magnesium (MAGNESIUM-OXIDE) 250 MG TABS tablet Take 250 mg by mouth daily      Multiple Vitamins-Minerals (THERAPEUTIC MULTIVITAMIN-MINERALS) tablet Take 1 tablet by mouth daily      polyethylene glycol (GLYCOLAX) powder Take 17 g by mouth daily       No current facility-administered medications for this visit. Brian BOO   Review of Systems   Constitutional: Negative for activity change, appetite change, chills, diaphoresis, fatigue, fever and unexpected weight change. HENT: Positive for congestion, ear pain, hearing loss, sinus pressure, sinus pain and sore throat. Negative for postnasal drip, rhinorrhea, trouble swallowing and voice change. Eyes: Negative. Respiratory: Negative for cough, chest tightness, shortness of breath and wheezing. Cardiovascular: Negative for chest pain, palpitations and leg swelling. Gastrointestinal: Negative for abdominal pain, diarrhea, nausea and vomiting. Genitourinary: Negative. Musculoskeletal: Negative. Skin: Negative. Allergic/Immunologic: Positive for environmental allergies. Negative for food allergies and immunocompromised state. Neurological: Negative. Hematological: Does not bruise/bleed easily. Psychiatric/Behavioral: Negative for sleep disturbance and suicidal ideas.         Physical exam   /74 (Site: Left Upper Arm, Position: Sitting, Cuff Size: Large Adult)   Pulse 77   Temp 97.4 °F (36.3 °C) (Tympanic)   Ht 5' 7\" (1.702 m)   Wt 206 lb 6.4 oz (93.6 kg)   SpO2 96% Comment: on room air at rest  BMI 32.33 kg/m²      Wt Readings from Last 3 Encounters:   09/18/20 206 lb 6.4 oz (93.6 kg)   08/07/20 203 lb (92.1 kg)   07/06/20 205 lb (93 kg)     Physical Exam  Vitals signs and nursing note reviewed. Constitutional:       General: She is not in acute distress. Appearance: She is well-developed and overweight. Interventions: Face mask in place. HENT:      Right Ear: Tympanic membrane and ear canal normal. Tympanic membrane is not erythematous or retracted. Left Ear: Tympanic membrane and ear canal normal. Tympanic membrane is not erythematous or retracted. Nose:      Right Turbinates: Not enlarged. Left Turbinates: Not enlarged. Right Sinus: No maxillary sinus tenderness or frontal sinus tenderness. Left Sinus: No maxillary sinus tenderness or frontal sinus tenderness. Mouth/Throat:      Lips: Pink. Mouth: Mucous membranes are moist.      Pharynx: Oropharynx is clear. No oropharyngeal exudate or posterior oropharyngeal erythema. Tonsils: No tonsillar exudate. Eyes:      General: No allergic shiner. Conjunctiva/sclera: Conjunctivae normal.   Neck:      Vascular: No JVD. Comments: Enlarged lymph node right side of neck, tender to touch  Cardiovascular:      Rate and Rhythm: Normal rate and regular rhythm. Heart sounds: No murmur. No friction rub. Pulmonary:      Effort: Pulmonary effort is normal. No accessory muscle usage or respiratory distress. Breath sounds: Normal breath sounds. No wheezing, rhonchi or rales. Chest:      Chest wall: No tenderness. Musculoskeletal:      Right lower leg: No edema. Left lower leg: No edema. Lymphadenopathy:      Cervical: Cervical adenopathy present. Skin:     General: Skin is warm and dry. Capillary Refill: Capillary refill takes less than 2 seconds. Nails: There is no clubbing. Neurological:      Mental Status: She is alert.    Psychiatric:         Mood and Affect: Mood normal.         Behavior: Behavior normal.         Thought Content: Thought content normal.         Judgment: Judgment normal.          Test results   Lung Nodule Screening     [] Qualifies    [x]Does not qualify   [] Declined    [] Completed                         Assessment      Diagnosis Orders   1. Dry mouth  Anti SSA    Anti SSB    Rheumatoid factor screen    MANDA Screen with Reflex   2. Swelling of salivary gland  Anti SSA    Anti SSB    Rheumatoid factor screen    MANDA Screen with Reflex   3. Moderate persistent allergic asthma     4. Persistent cough for 3 weeks or longer     5.  Other acute sinusitis, recurrence not specified         labs unremarkable, no eosinophilia or elevated IgE to explain continued symptoms   Plan   -labs to r/o Sjogrens  -continue Symbicort 80/4.5 mcg   -Augmentin x 10 days   -Prednisone taper  -Add Flonase   -stop Allegra    Will see Floridalma Becerril in: 2 weeks    Electronically signed by MYLA Thakkar CNP on 9/18/2020 at 9:30 AM

## 2020-10-02 ENCOUNTER — VIRTUAL VISIT (OUTPATIENT)
Dept: PULMONOLOGY | Age: 65
End: 2020-10-02
Payer: COMMERCIAL

## 2020-10-02 PROCEDURE — 99213 OFFICE O/P EST LOW 20 MIN: CPT | Performed by: NURSE PRACTITIONER

## 2020-10-02 RX ORDER — AMOXICILLIN AND CLAVULANATE POTASSIUM 875; 125 MG/1; MG/1
1 TABLET, FILM COATED ORAL 2 TIMES DAILY
Qty: 14 TABLET | Refills: 0 | Status: SHIPPED | OUTPATIENT
Start: 2020-10-02 | End: 2020-10-09

## 2020-10-02 ASSESSMENT — ENCOUNTER SYMPTOMS
VOMITING: 0
SINUS PRESSURE: 1
SHORTNESS OF BREATH: 0
DIARRHEA: 0
COUGH: 0
EYES NEGATIVE: 1
SINUS PAIN: 1
ABDOMINAL PAIN: 0
WHEEZING: 0
NAUSEA: 0

## 2020-10-02 NOTE — PROGRESS NOTES
[facility]     Patient: Mode Dear, 77 y.o.   : 1955  10/2/2020    Pt of Dr. Natalio Hammond   Patient presents with    Asthma     with labs       TELEHEALTH EVALUATION -- Audio/Visual (During RY- public health emergency)         HPI  Sole Mcnamara is here for follow up for her asthma. Has been having dry mouth, sinus issues, enlarged lymph nodes. Ct chest clear. Got labs to r/o Sjogrens, which were normal except slight elevation of her RF   Had recent shoulder surgery on right. All her sinus pain, ear pain on right. Treated for sinusitis 2 weeks ago with 10 days Augmentin.  Was feeling better and now over past 2-3 days she has more pain and pressure on the right and now dizziness and loss of balance  Has seen ENT and \"couldn't find anything\"  Feels her asthma is under the best control it ever has been using Symbicort 160/4.5 mcg   Mini rast/ IgE / cbc unremarkable   Past Medical hx   PMH:  Past Medical History:   Diagnosis Date    Anemia     Arthritis     general    Asthma     Atrial fibrillation ([address]     Backache     Colitis     Disease of blood and blood forming organ     anemia    Eczema     Fibromyalgia     Fibula fracture     GERD (gastroesophageal reflux disease)     hiatal hernia, GERD     Hernia, hiatal 2015    Hypoglycemia     Hypothyroidism     PAF (paroxysmal atrial fibrillation) ([address] 2018    Perianal abscess 2001    Ruptured disk 1996    Torn ligament 2005    right thumb     SURGICAL HISTORY:  Past Surgical History:   Procedure Laterality Date    APPENDECTOMY  1986    BACK SURGERY      BARTHOLIN GLAND CYST EXCISION      CARDIAC SURGERY      fix A Fib    CHOLECYSTECTOMY  2002    COLONOSCOPY  ,,    CYSTOCELE REPAIR  2001, 2001    [address] HERNIA REPAIR  10/2017    HYSTERECTOMY  1986    KNEE SURGERY Right 2016    meniscus repair    daily      polyethylene glycol (GLYCOLAX) powder Take 17 g by mouth daily       No current facility-administered medications for this visit. Keira BOO   Review of Systems   Constitutional: Negative for activity change, appetite change, chills, fatigue, fever and unexpected weight change. HENT: Positive for congestion, ear pain, sinus pressure and sinus pain. Eyes: Negative. Respiratory: Negative for cough, shortness of breath and wheezing. Cardiovascular: Negative for chest pain, palpitations and leg swelling. Gastrointestinal: Negative for abdominal pain, diarrhea, nausea and vomiting. Genitourinary: Negative. Musculoskeletal: Positive for arthralgias (right shoulder pain ), gait problem and joint swelling. Skin: Negative. Neurological: Positive for dizziness and headaches. Hematological: Does not bruise/bleed easily. Psychiatric/Behavioral: Negative for suicidal ideas. Physical exam   There were no vitals taken for this visit. Wt Readings from Last 3 Encounters:   09/18/20 206 lb 6.4 oz (93.6 kg)   08/07/20 203 lb (92.1 kg)   07/06/20 205 lb (93 kg)     Physical Exam  Constitutional:       Appearance: Normal appearance. HENT:      Head: Normocephalic and atraumatic. Eyes:      Conjunctiva/sclera: Conjunctivae normal.   Pulmonary:      Effort: No tachypnea, bradypnea or respiratory distress. Neurological:      Mental Status: She is alert and oriented to person, place, and time. Psychiatric:         Attention and Perception: Attention normal.         Mood and Affect: Mood normal.         Speech: Speech normal.         Behavior: Behavior normal.         Thought Content:  Thought content normal.         Cognition and Memory: Cognition normal.         Judgment: Judgment normal.          Test results   Lung Nodule Screening     [] Qualifies    []Does not qualify   [] Declined    [] Completed                          Ct sinuses from 2017    Assessment      Diagnosis

## 2021-01-15 ENCOUNTER — OFFICE VISIT (OUTPATIENT)
Dept: PULMONOLOGY | Age: 66
End: 2021-01-15
Payer: COMMERCIAL

## 2021-01-15 VITALS
OXYGEN SATURATION: 97 % | BODY MASS INDEX: 33.65 KG/M2 | HEIGHT: 67 IN | WEIGHT: 214.4 LBS | HEART RATE: 91 BPM | DIASTOLIC BLOOD PRESSURE: 68 MMHG | TEMPERATURE: 97.4 F | SYSTOLIC BLOOD PRESSURE: 124 MMHG

## 2021-01-15 DIAGNOSIS — J06.9 RECURRENT UPPER RESPIRATORY INFECTION (URI): ICD-10-CM

## 2021-01-15 DIAGNOSIS — R05.3 CHRONIC COUGH: ICD-10-CM

## 2021-01-15 DIAGNOSIS — J45.40 MODERATE PERSISTENT ALLERGIC ASTHMA: ICD-10-CM

## 2021-01-15 DIAGNOSIS — J82.83 EOSINOPHILIC ASTHMA: Primary | ICD-10-CM

## 2021-01-15 PROCEDURE — 99214 OFFICE O/P EST MOD 30 MIN: CPT | Performed by: NURSE PRACTITIONER

## 2021-01-15 RX ORDER — PREDNISONE 10 MG/1
10 TABLET ORAL DAILY
Qty: 30 TABLET | Refills: 0 | Status: SHIPPED | OUTPATIENT
Start: 2021-01-15 | End: 2021-04-16

## 2021-01-15 RX ORDER — IPRATROPIUM BROMIDE AND ALBUTEROL SULFATE 2.5; .5 MG/3ML; MG/3ML
1 SOLUTION RESPIRATORY (INHALATION) EVERY 4 HOURS
Qty: 360 ML | Refills: 3 | Status: SHIPPED | OUTPATIENT
Start: 2021-01-15 | End: 2022-03-04 | Stop reason: ALTCHOICE

## 2021-01-15 ASSESSMENT — ENCOUNTER SYMPTOMS
SHORTNESS OF BREATH: 1
SINUS PRESSURE: 1
ABDOMINAL PAIN: 0
DIARRHEA: 0
CHEST TIGHTNESS: 0
COUGH: 0
VOMITING: 0
NAUSEA: 0
EYES NEGATIVE: 1
WHEEZING: 1

## 2021-01-15 NOTE — PROGRESS NOTES
[facility]     Patient: Raul Orozco, 77 y.o.   : 1955  1/15/2021    Pt of Dr. Mckenzie Son   Patient presents with    Asthma     3 mo f/u asthma, sinusitis        HPI  Rianna Newell is here for 3 month follow up for asthma   Chronic dry mouth and enlarged cervical lymph node on exam last visit, labs to r/o sjogrens were negative. Did have slightly elevated RF on uncertain significance or etiology , may be normal variant in ages > 48 . H/o hypothyroidism, on supplement, monitored by PCP    Treated for sinusitis w/ Augmentin in Oct . Frequent sinusutis infections. Had CT sinus and soft tissue neck in 2017- normal   Has had recent US neck   Saw Dr Nicole Brady, in past per pt had scope of nose was told \"nothing he could do\"   Does do sinus rinses. For asthma on Symbicort 160/4.5 mcg and PRN albuterol. Has nebulizer and Duonebs at home, uses when has infection. Needs refills     Saw allergist many years ago, was on allergy shots at one time. Can not remember provider name. Mini rast a few months ago. No elevation of IgE levels. CBC w/ diff did show abs eos of 201.    Responds the most to oral steroids when gets ill         Past Medical hx   PMH:  Past Medical History:   Diagnosis Date    Anemia     Arthritis     general    Asthma     Atrial fibrillation ([address]     Backache     Colitis     Disease of blood and blood forming organ     anemia    Eczema     Fibromyalgia     Fibula fracture     GERD (gastroesophageal reflux disease)     hiatal hernia, GERD     Hernia, hiatal 2015    Hypoglycemia     Hypothyroidism     PAF (paroxysmal atrial fibrillation) ([address] 2018    Perianal abscess 2001    Ruptured disk 1996    Torn ligament 2005    right thumb     SURGICAL HISTORY:  Past Surgical History:   Procedure Laterality Date    APPENDECTOMY  1986    BACK SURGERY      BARTHOLIN GLAND CYST EXCISION  2002    CARDIAC SURGERY fix A Fib    CHOLECYSTECTOMY  2002    COLONOSCOPY  [id number]    CYSTOCELE REPAIR  05/2001, 11/2001   [place] HERNIA REPAIR  10/2017    HYSTERECTOMY  1986    KNEE SURGERY Right 03/2016    meniscus repair    PARTIAL HYSTERECTOMY  1986    RECTOCELE REPAIR  2002    ROTATOR CUFF REPAIR Right 09/09/2020   [employer] STOMACH SURGERY  October    UPPER GASTROINTESTINAL ENDOSCOPY  3731,5807    VARICOSE VEIN SURGERY  6041,0501     SOCIAL HISTORY:  Social History     Tobacco Use    Smoking status: Passive Smoke Exposure - Never Smoker    Smokeless tobacco: Never Used   Substance Use Topics    Alcohol use: Yes     Alcohol/week: 0.0 standard drinks     Comment: occ    Drug use: No     ALLERGIES:  Allergies   Allergen Reactions    Nexium [Esomeprazole Magnesium] Other (See Comments)     headaches    Cipro Xr Nausea And Vomiting     FAMILY HISTORY:  Family History   Problem Relation Age of Onset   [employer] Migraines Mother     Hypertension Mother     Stroke Mother     Arthritis Father     Cancer Father         lung    Parkinsonism Brother      CURRENT MEDICATIONS:  Current Outpatient Medications   Medication Sig Dispense Refill    predniSONE (DELTASONE) 10 MG tablet Take 1 tablet by mouth daily 4 tablets x 3 days, 3 tablets daily x 3 days, 2 tablets daily x 3 days, 1 tablet daily x 3 days 30 tablet 0    ipratropium-albuterol (DUONEB) 0.5-2.5 (3) MG/3ML SOLN nebulizer solution Inhale 3 mLs into the lungs every 4 hours 360 mL 3    naproxen sodium (ALEVE) 220 MG tablet Take 220 mg by mouth 2 times daily (with meals)      fluticasone (FLONASE) 50 MCG/ACT nasal spray 2 sprays by Each Nostril route daily 3 Bottle 1    budesonide-formoterol (SYMBICORT) 160-4.5 MCG/ACT AERO Inhale 2 puffs into the lungs 2 times daily Rinse mouth after its use.  3 Inhaler 3    SYNTHROID 88 MCG tablet TAKE 1 TABLET DAILY 90 tablet 3    Probiotic Product (PROBIOTIC-10) CAPS Take 1 capsule by mouth daily      Fexofenadine HCl (ALLEGRA PO) Take by mouth      levalbuterol (XOPENEX HFA) 45 MCG/ACT inhaler Inhale 1-2 puffs into the lungs every 4 hours as needed for Wheezing or Shortness of Breath 1 Inhaler 5    magnesium (MAGNESIUM-OXIDE) 250 MG TABS tablet Take 250 mg by mouth daily      Multiple Vitamins-Minerals (THERAPEUTIC MULTIVITAMIN-MINERALS) tablet Take 1 tablet by mouth daily      polyethylene glycol (GLYCOLAX) powder Take 17 g by mouth daily       No current facility-administered medications for this visit. Angella Profit ROS   Review of Systems   Constitutional: Negative for activity change, chills, fatigue and fever. HENT: Positive for congestion and sinus pressure. Eyes: Negative. Respiratory: Positive for shortness of breath and wheezing. Negative for cough and chest tightness. Cardiovascular: Negative for chest pain, palpitations and leg swelling. Gastrointestinal: Negative for abdominal pain, diarrhea, nausea and vomiting. Genitourinary: Negative. Musculoskeletal: Negative. Skin: Negative. Allergic/Immunologic: Positive for environmental allergies. Negative for food allergies. Neurological: Negative. Hematological: Does not bruise/bleed easily. Psychiatric/Behavioral: Negative for sleep disturbance and suicidal ideas. Physical exam   /68 (Site: Right Upper Arm, Position: Sitting, Cuff Size: Medium Adult)   Pulse 91   Temp 97.4 °F (36.3 °C)   Ht 5' 7\" (1.702 m)   Wt 214 lb 6.4 oz (97.3 kg)   SpO2 97% Comment: on room air  BMI 33.58 kg/m²      Wt Readings from Last 3 Encounters:   01/15/21 214 lb 6.4 oz (97.3 kg)   09/18/20 206 lb 6.4 oz (93.6 kg)   08/07/20 203 lb (92.1 kg)     Physical Exam  Vitals signs and nursing note reviewed. Constitutional:       General: She is not in acute distress. Appearance: She is well-developed. HENT:      Mouth/Throat:      Lips: Pink. Mouth: Mucous membranes are moist.      Pharynx: Oropharynx is clear.  No oropharyngeal exudate or posterior oropharyngeal erythema. Eyes:      Conjunctiva/sclera: Conjunctivae normal.   Neck:      Vascular: No JVD. Comments: Tenderness to right neck below ear , slightly enlarged lymph node felt   Cardiovascular:      Rate and Rhythm: Normal rate and regular rhythm. Heart sounds: No murmur. No friction rub. Pulmonary:      Effort: Pulmonary effort is normal. No accessory muscle usage or respiratory distress. Breath sounds: Normal breath sounds. No wheezing, rhonchi or rales. Chest:      Chest wall: No tenderness. Musculoskeletal:      Right lower leg: No edema. Left lower leg: No edema. Skin:     General: Skin is warm and dry. Capillary Refill: Capillary refill takes less than 2 seconds. Nails: There is no clubbing. Neurological:      Mental Status: She is alert. Psychiatric:         Mood and Affect: Mood normal.         Behavior: Behavior normal.         Thought Content: Thought content normal.         Judgment: Judgment normal.          Test results   Lung Nodule Screening     [] Qualifies    [x]Does not qualify   [] Declined    [] Completed       Assessment/ Plan       Diagnosis Orders   1. Eosinophilic asthma     2. Moderate persistent allergic asthma     3. Recurrent upper respiratory infection (URI)     4. Chronic cough       -uncontrolled asthma despite compliant use of ICS/LABA , with recurrent URI's and need for oral steroids about every 3 months. : start Fasenra auto inj. - pt interested in home inj .    -chronic cough due to uncontrolled asthma  -Prednisone taper  -refill Duoneb, use Q 4 H as needed  -continue Symbicort 160/4.5 mcg  -continue thyroid meds as prescribed  -avoid ill contacts  -ensure UTD with vaccinations. Total time spent interviewing the patient, evaluating lab data and X-ray data and processing orders was 38 min . I personally spent more than 50% of the appointment time face to face with the patient providing counseling and coordinating the patient's care.        Will see Alejandro Gaytan in: 3 months    Electronically signed by MYLA Mann CNP on 1/15/2021 at 11:15 AM

## 2021-01-22 ENCOUNTER — TELEPHONE (OUTPATIENT)
Dept: PULMONOLOGY | Age: 66
End: 2021-01-22

## 2021-01-22 NOTE — TELEPHONE ENCOUNTER
Spoke with Aaliyah, she phoned patient in regards to her Shala Guerra form needing signed. Aaliyah stated she mailed form to patient. Phoned and notified patient.

## 2021-01-22 NOTE — TELEPHONE ENCOUNTER
Patient called in and reported someone left a message on her phone about her needing to sign a paper at the [facility] office, unable to locate what was needed. Staff will call next [facility] day if needed.

## 2021-01-29 ENCOUNTER — TELEPHONE (OUTPATIENT)
Dept: PULMONOLOGY | Age: 66
End: 2021-01-29

## 2021-02-10 ENCOUNTER — TELEPHONE (OUTPATIENT)
Dept: PULMONOLOGY | Age: 66
End: 2021-02-10

## 2021-02-10 DIAGNOSIS — J82.83 EOSINOPHILIC ASTHMA: ICD-10-CM

## 2021-02-10 DIAGNOSIS — J45.40 MODERATE PERSISTENT ALLERGIC ASTHMA: Primary | ICD-10-CM

## 2021-02-10 NOTE — TELEPHONE ENCOUNTER
Needs repeat labs, insurance not accepting eosinophil level from Aug 2020.  Order placed for pt to repeat CBC w/ diff if still interested in proceeding

## 2021-04-05 DIAGNOSIS — E03.9 HYPOTHYROIDISM, UNSPECIFIED TYPE: ICD-10-CM

## 2021-04-05 RX ORDER — LEVOTHYROXINE SODIUM 88 MCG
TABLET ORAL
Qty: 90 TABLET | Refills: 3 | Status: SHIPPED | OUTPATIENT
Start: 2021-04-05

## 2021-04-16 ENCOUNTER — OFFICE VISIT (OUTPATIENT)
Dept: PULMONOLOGY | Age: 66
End: 2021-04-16
Payer: COMMERCIAL

## 2021-04-16 VITALS
DIASTOLIC BLOOD PRESSURE: 74 MMHG | BODY MASS INDEX: 33.46 KG/M2 | HEART RATE: 80 BPM | OXYGEN SATURATION: 95 % | WEIGHT: 213.2 LBS | SYSTOLIC BLOOD PRESSURE: 122 MMHG | TEMPERATURE: 98.3 F | HEIGHT: 67 IN

## 2021-04-16 DIAGNOSIS — J45.40 MODERATE PERSISTENT ALLERGIC ASTHMA: Primary | ICD-10-CM

## 2021-04-16 DIAGNOSIS — R68.2 DRY MOUTH: ICD-10-CM

## 2021-04-16 PROCEDURE — 99213 OFFICE O/P EST LOW 20 MIN: CPT | Performed by: NURSE PRACTITIONER

## 2021-04-16 RX ORDER — LEVOTHYROXINE SODIUM 0.1 MG/1
100 TABLET ORAL DAILY
COMMUNITY

## 2021-04-16 RX ORDER — PANTOPRAZOLE SODIUM 20 MG/1
20 TABLET, DELAYED RELEASE ORAL DAILY
COMMUNITY

## 2021-04-16 ASSESSMENT — ENCOUNTER SYMPTOMS
WHEEZING: 0
SHORTNESS OF BREATH: 0
COUGH: 0
ABDOMINAL PAIN: 0
VOMITING: 0
NAUSEA: 0
DIARRHEA: 0
EYES NEGATIVE: 1

## 2021-04-16 NOTE — PROGRESS NOTES
(93.6 kg)     Physical Exam  Vitals signs and nursing note reviewed. Constitutional:       General: She is not in acute distress. Appearance: She is well-developed. HENT:      Mouth/Throat:      Lips: Pink. Mouth: Mucous membranes are moist.      Pharynx: Oropharynx is clear. No oropharyngeal exudate or posterior oropharyngeal erythema. Eyes:      Conjunctiva/sclera: Conjunctivae normal.   Neck:      Vascular: No JVD. Cardiovascular:      Rate and Rhythm: Normal rate and regular rhythm. Heart sounds: No murmur. No friction rub. Pulmonary:      Effort: Pulmonary effort is normal. No accessory muscle usage or respiratory distress. Breath sounds: Normal breath sounds. No wheezing, rhonchi or rales. Chest:      Chest wall: No tenderness. Musculoskeletal:      Right lower leg: No edema. Left lower leg: No edema. Skin:     General: Skin is warm and dry. Capillary Refill: Capillary refill takes less than 2 seconds. Nails: There is no clubbing. Neurological:      Mental Status: She is alert. Psychiatric:         Mood and Affect: Mood normal.         Behavior: Behavior normal.         Thought Content: Thought content normal.         Judgment: Judgment normal.          Test results   Lung Nodule Screening     [] Qualifies    [x]Does not qualify   [] Declined    [] Completed    Assessment      Diagnosis Orders   1. Moderate persistent allergic asthma     2.  Dry mouth       Plan    -continue symbicort 2 puffs bid  -PRN Duoneb  -did get COVID vaccines    Total time spent on encounter was 20 minutes    Will see Bal Borden in: 1 year, call for sooner appt if any worsening symptoms   Electronically signed by MYLA Morgan CNP on 4/16/2021 at 12:40 PM

## 2022-03-04 ENCOUNTER — OFFICE VISIT (OUTPATIENT)
Dept: PULMONOLOGY | Age: 67
End: 2022-03-04
Payer: COMMERCIAL

## 2022-03-04 VITALS
OXYGEN SATURATION: 98 % | HEIGHT: 67 IN | TEMPERATURE: 96.8 F | DIASTOLIC BLOOD PRESSURE: 76 MMHG | HEART RATE: 87 BPM | WEIGHT: 213.8 LBS | SYSTOLIC BLOOD PRESSURE: 124 MMHG | BODY MASS INDEX: 33.56 KG/M2

## 2022-03-04 DIAGNOSIS — J45.40 MODERATE PERSISTENT ALLERGIC ASTHMA: Primary | ICD-10-CM

## 2022-03-04 DIAGNOSIS — R05.3 CHRONIC COUGH: ICD-10-CM

## 2022-03-04 DIAGNOSIS — J82.83 EOSINOPHILIC ASTHMA: ICD-10-CM

## 2022-03-04 DIAGNOSIS — I48.91 ATRIAL FIBRILLATION, UNSPECIFIED TYPE (HCC): ICD-10-CM

## 2022-03-04 PROBLEM — L57.0 ACTINIC KERATOSIS: Status: ACTIVE | Noted: 2022-03-04

## 2022-03-04 PROBLEM — L82.1 SEBORRHEIC KERATOSIS: Status: ACTIVE | Noted: 2022-03-04

## 2022-03-04 PROBLEM — G93.32 CHRONIC FATIGUE SYNDROME: Status: ACTIVE | Noted: 2022-03-04

## 2022-03-04 PROBLEM — M47.899 FACET SYNDROME: Status: ACTIVE | Noted: 2018-11-07

## 2022-03-04 PROBLEM — K44.9 PARAESOPHAGEAL HERNIA: Status: ACTIVE | Noted: 2019-03-06

## 2022-03-04 PROCEDURE — 99214 OFFICE O/P EST MOD 30 MIN: CPT | Performed by: NURSE PRACTITIONER

## 2022-03-04 RX ORDER — FLUTICASONE FUROATE, UMECLIDINIUM BROMIDE AND VILANTEROL TRIFENATATE 200; 62.5; 25 UG/1; UG/1; UG/1
1 POWDER RESPIRATORY (INHALATION) DAILY
Qty: 90 EACH | Refills: 3 | Status: SHIPPED | OUTPATIENT
Start: 2022-03-04 | End: 2022-04-11 | Stop reason: SINTOL

## 2022-03-04 RX ORDER — FLUTICASONE FUROATE, UMECLIDINIUM BROMIDE AND VILANTEROL TRIFENATATE 200; 62.5; 25 UG/1; UG/1; UG/1
1 POWDER RESPIRATORY (INHALATION) DAILY
Qty: 30 EACH | Refills: 0 | Status: SHIPPED | OUTPATIENT
Start: 2022-03-04 | End: 2022-04-11 | Stop reason: SINTOL

## 2022-03-04 ASSESSMENT — ENCOUNTER SYMPTOMS
VOMITING: 0
ABDOMINAL PAIN: 0
CHEST TIGHTNESS: 1
COUGH: 1
DIARRHEA: 0
EYES NEGATIVE: 1
SHORTNESS OF BREATH: 0
NAUSEA: 0
WHEEZING: 1

## 2022-03-04 NOTE — PROGRESS NOTES
[facility]     Patient: Merlyn Corcoran, 69 y.o.   : 1955  3/4/2022    Pt of Dr. Bhumi Michaels   Patient presents with    Follow-up     Moderate persistent allergic asthma 10 month pulmonary follow up         HPI  Rosita Kothari is here for 10 month  follow up for Asthma  Current Inhalers:  Symbicort 160/4.5 mcg - BID, Duoneb- not using, Levalbuterol HFA - rarely uses  Abs eos 201  IgE- wnl   C/o chronic sinus congestion/ frequent infections, did see ENT in past, has scope was told no  problems seen   Normal CT sinuses in 2017   Requires atb and steroids at least 3x per year   Normal CXR   Payor declined Gina Linares   Was just on steroids for sinus infection / wheezing 1 month ago     chronic dyspnea, cough productive of unsure of color , swallows it  sputum in small amounts and wheezing   Symptoms are stable   Takes Muccinex PRN with some benefit of loosening phlemg in throat   Takes daily Allegra allergy med     Any recent exacerbations that have required oral atb or steroids Yes  Any new medical issues since last visit : No    Last spirometry: 2019 - mild obstruction   Is not on home O2      Internal Administration   First Dose COVID-19, Moderna, Primary or Immunocompromised, PF, 100mcg/0.5mL  2021   Second Dose COVID-19, Mechele Fort, Primary or Immunocompromised, PF, 100mcg/0.5mL   2021       Last COVID Lab No results found for: SARS-COV-2, SARS-COV-2 RNA, SARS-COV-2, SARS-COV-2, SARS-COV-2 BY PCR, SARS-COV-2, SARS-COV-2, SARS-COV-2            SOCIAL HISTORY:  Social History     Tobacco Use    Smoking status: Passive Smoke Exposure - Never Smoker    Smokeless tobacco: Never Used   Substance Use Topics    Alcohol use:  Yes     Alcohol/week: 0.0 standard drinks     Comment: occ    Drug use: No         CURRENT MEDICATIONS:  Current Outpatient Medications   Medication Sig Dispense Refill    Polyethylene Glycol 3350 (MIRALAX PO)       Fluticasone-Umeclidin-Vilant (TRELEGY ELLIPTA) 200-62.5-25 MCG/INH AEPB Inhale 1 puff into the lungs daily 90 each 3    Fluticasone-Umeclidin-Vilant (TRELEGY ELLIPTA) 200-62.5-25 MCG/INH AEPB Inhale 1 puff into the lungs daily Rinse after use 30 each 0    levothyroxine (SYNTHROID) 100 MCG tablet Take 100 mcg by mouth Daily      Cholecalciferol (VITAMIN D3 PO) Take by mouth      pantoprazole (PROTONIX) 20 MG tablet Take 20 mg by mouth daily      SYNTHROID 88 MCG tablet TAKE 1 TABLET DAILY 90 tablet 3    naproxen sodium (ALEVE) 220 MG tablet Take 220 mg by mouth 2 times daily (with meals)      Probiotic Product (PROBIOTIC-10) CAPS Take 1 capsule by mouth daily      Fexofenadine HCl (ALLEGRA PO) Take by mouth      levalbuterol (XOPENEX HFA) 45 MCG/ACT inhaler Inhale 1-2 puffs into the lungs every 4 hours as needed for Wheezing or Shortness of Breath 1 Inhaler 5    magnesium (MAGNESIUM-OXIDE) 250 MG TABS tablet Take 250 mg by mouth daily      Multiple Vitamins-Minerals (THERAPEUTIC MULTIVITAMIN-MINERALS) tablet Take 1 tablet by mouth daily      polyethylene glycol (GLYCOLAX) powder Take 17 g by mouth daily      Calcium-Magnesium-Zinc 333-133-5 MG TABS Take by mouth (Patient not taking: Reported on 3/4/2022)      fluticasone (FLONASE) 50 MCG/ACT nasal spray 2 sprays by Each Nostril route daily (Patient not taking: Reported on 3/4/2022) 3 Bottle 1     No current facility-administered medications for this visit. Evangelist BOO   Review of Systems   Constitutional: Negative for activity change, appetite change, chills, fatigue, fever and unexpected weight change. HENT: Positive for congestion. Eyes: Negative. Respiratory: Positive for cough, chest tightness and wheezing. Negative for shortness of breath. Cardiovascular: Negative for chest pain, palpitations and leg swelling. Gastrointestinal: Negative for abdominal pain, diarrhea, nausea and vomiting. Genitourinary: Negative.     Musculoskeletal: Negative. Skin: Negative. Neurological: Negative. Hematological: Negative. Psychiatric/Behavioral: Negative. Physical exam   /76 (Site: Left Upper Arm, Position: Sitting)   Pulse 87   Temp 96.8 °F (36 °C)   Ht 5' 7\" (1.702 m)   Wt 213 lb 12.8 oz (97 kg)   SpO2 98% Comment: on ra  BMI 33.49 kg/m²      Wt Readings from Last 3 Encounters:   03/04/22 213 lb 12.8 oz (97 kg)   04/16/21 213 lb 3.2 oz (96.7 kg)   01/15/21 214 lb 6.4 oz (97.3 kg)     Physical Exam  Vitals and nursing note reviewed. Constitutional:       General: She is not in acute distress. Appearance: She is well-developed and overweight. HENT:      Mouth/Throat:      Lips: Pink. Mouth: Mucous membranes are moist.      Pharynx: Oropharynx is clear. No oropharyngeal exudate or posterior oropharyngeal erythema. Eyes:      Conjunctiva/sclera: Conjunctivae normal.   Neck:      Vascular: No JVD. Cardiovascular:      Rate and Rhythm: Normal rate and regular rhythm. Heart sounds: No murmur heard. No friction rub. Pulmonary:      Effort: Pulmonary effort is normal. No accessory muscle usage or respiratory distress. Breath sounds: Normal breath sounds. No wheezing, rhonchi or rales. Chest:      Chest wall: No tenderness. Musculoskeletal:      Right lower leg: No edema. Left lower leg: No edema. Skin:     General: Skin is warm and dry. Capillary Refill: Capillary refill takes less than 2 seconds. Nails: There is no clubbing. Neurological:      Mental Status: She is alert and oriented to person, place, and time. Psychiatric:         Mood and Affect: Mood normal.         Behavior: Behavior normal.         Thought Content: Thought content normal.         Judgment: Judgment normal.          Test results   Lung Nodule Screening     [] Qualifies    [x]Does not qualify   [] Declined    [] Completed     Assessment      Diagnosis Orders   1.  Moderate persistent allergic asthma Fluticasone-Umeclidin-Vilant (TRELEGY ELLIPTA) 200-62.5-25 MCG/INH AEPB    Fluticasone-Umeclidin-Vilant (TRELEGY ELLIPTA) 865-46.7-26 MCG/INH AEPB   2. Chronic cough     3. Atrial fibrillation, unspecified type ([facility] [address]     4.  Eosinophilic asthma       Plan    -stop Symbicort, needs therapy escalated due to uncontrolled symptoms:   Start Trelegy ellipta 200 mcg , given one month free coupon with printed script to take to local pharmacy and 90 day supply [facility]   Instructed to rinse after use  Continue PRN albuterol   PRN muccinex  Daily PO antihistamine     Will see Karly Jackson in: 2 months  billing based on medical decision making   Electronically signed by MYLA Pimentel CNP on 3/4/2022 at 12:53 PM

## 2022-04-11 ENCOUNTER — TELEPHONE (OUTPATIENT)
Dept: PULMONOLOGY | Age: 67
End: 2022-04-11

## 2022-04-11 RX ORDER — BUDESONIDE AND FORMOTEROL FUMARATE DIHYDRATE 160; 4.5 UG/1; UG/1
2 AEROSOL RESPIRATORY (INHALATION) 2 TIMES DAILY
Qty: 3 EACH | Refills: 3 | Status: SHIPPED | OUTPATIENT
Start: 2022-04-11 | End: 2023-04-11

## 2022-04-11 NOTE — TELEPHONE ENCOUNTER
I called and spoke to patient and she is advised to stop taking trelegy and that you sent an order to [facility] for Symbicort.

## 2022-04-11 NOTE — TELEPHONE ENCOUNTER
Patient called stating ever since she was put on Trelegy that she has bloating in her abdomen  And she always feels full, She is having issues while emptying her bladder and when she does it feels she has to go again, She is also gaining at least one pound a week. She was asking if she could go back on Symbicort instead of Trelegy. She is thinking its from the Trelegy.

## 2022-06-17 ENCOUNTER — OFFICE VISIT (OUTPATIENT)
Dept: PULMONOLOGY | Age: 67
End: 2022-06-17
Payer: COMMERCIAL

## 2022-06-17 ENCOUNTER — TELEPHONE (OUTPATIENT)
Dept: PULMONOLOGY | Age: 67
End: 2022-06-17

## 2022-06-17 VITALS
TEMPERATURE: 97.9 F | DIASTOLIC BLOOD PRESSURE: 74 MMHG | BODY MASS INDEX: 33.71 KG/M2 | HEART RATE: 73 BPM | WEIGHT: 214.8 LBS | SYSTOLIC BLOOD PRESSURE: 122 MMHG | HEIGHT: 67 IN | OXYGEN SATURATION: 95 %

## 2022-06-17 DIAGNOSIS — R05.3 CHRONIC COUGH: ICD-10-CM

## 2022-06-17 DIAGNOSIS — J45.40 MODERATE PERSISTENT ALLERGIC ASTHMA: Primary | ICD-10-CM

## 2022-06-17 PROCEDURE — 1123F ACP DISCUSS/DSCN MKR DOCD: CPT | Performed by: NURSE PRACTITIONER

## 2022-06-17 PROCEDURE — 99214 OFFICE O/P EST MOD 30 MIN: CPT | Performed by: NURSE PRACTITIONER

## 2022-06-17 RX ORDER — TIOTROPIUM BROMIDE INHALATION SPRAY 1.56 UG/1
2 SPRAY, METERED RESPIRATORY (INHALATION) DAILY
Qty: 1 EACH | Refills: 3 | Status: SHIPPED | OUTPATIENT
Start: 2022-06-17 | End: 2022-09-02 | Stop reason: SDUPTHER

## 2022-06-17 ASSESSMENT — ENCOUNTER SYMPTOMS
COUGH: 1
SHORTNESS OF BREATH: 0
DIARRHEA: 0
CHEST TIGHTNESS: 1
EYES NEGATIVE: 1
ABDOMINAL PAIN: 0
NAUSEA: 0
VOMITING: 0
WHEEZING: 1

## 2022-06-17 NOTE — PROGRESS NOTES
Patient states she still has some wheezing and clear phlegm. Phlegm sometimes gets stuck in her throat. Trelegy was helping her out but caused her abdominal pain so she is currently taking Symbicort and her rescue. She states that she is not currently using her rescue inhaler, Xopenex.

## 2022-06-17 NOTE — PROGRESS NOTES
[facility]     Patient: Mer Fam, 69 y.o.   : 1955    Pt of Dr. Rey Drummond   Patient presents with    Follow-up     2 month asthma no testing        HPI  Latoya Kowalski is here for 2 month follow up for Asthma   Abs eos 201  IgE- wnl   C/o chronic sinus congestion/ frequent infections, did see ENT in past, has scope was told no  problems seen   Normal CT sinuses in 2017   Requires atb and steroids at least 3x per year   Normal CXR   Payor declined Fasenra   Current Inhalers:  Symbicort 160/4.5   Previous Inhalers:  trelgey     C/o occ. Wheezing and phlegm in throat which causes intermittent cough . Felt much benefit with phlegm and cough with trelegy but caused severe GI upset. Back on symbicort 160/4.5 mcg. SOCIAL HISTORY:  Social History     Tobacco Use    Smoking status: Passive Smoke Exposure - Never Smoker    Smokeless tobacco: Never Used   Substance Use Topics    Alcohol use: Yes     Alcohol/week: 0.0 standard drinks     Comment: occ    Drug use: No       CURRENT MEDICATIONS:  Current Outpatient Medications   Medication Sig Dispense Refill    COLLAGEN PO Take by mouth      tiotropium (SPIRIVA RESPIMAT) 1.25 MCG/ACT AERS inhaler Inhale 2 puffs into the lungs daily 1 each 3    budesonide-formoterol (SYMBICORT) 160-4.5 MCG/ACT AERO Inhale 2 puffs into the lungs 2 times daily Rinse mouth after its use.  3 each 3    Polyethylene Glycol 3350 (MIRALAX PO)       levothyroxine (SYNTHROID) 100 MCG tablet Take 100 mcg by mouth Daily      Cholecalciferol (VITAMIN D3 PO) Take by mouth      pantoprazole (PROTONIX) 20 MG tablet Take 20 mg by mouth daily      SYNTHROID 88 MCG tablet TAKE 1 TABLET DAILY 90 tablet 3    naproxen sodium (ALEVE) 220 MG tablet Take 220 mg by mouth 2 times daily (with meals)      Probiotic Product (PROBIOTIC-10) CAPS Take 1 capsule by mouth daily      Fexofenadine HCl (ALLEGRA PO) Take by mouth      magnesium (MAGNESIUM-OXIDE) 250 MG TABS tablet Take 250 mg by mouth daily      Multiple Vitamins-Minerals (THERAPEUTIC MULTIVITAMIN-MINERALS) tablet Take 1 tablet by mouth daily      polyethylene glycol (GLYCOLAX) powder Take 17 g by mouth daily      fluticasone (FLONASE) 50 MCG/ACT nasal spray 2 sprays by Each Nostril route daily (Patient not taking: Reported on 3/4/2022) 3 Bottle 1    levalbuterol (XOPENEX HFA) 45 MCG/ACT inhaler Inhale 1-2 puffs into the lungs every 4 hours as needed for Wheezing or Shortness of Breath (Patient not taking: Reported on 6/17/2022) 1 Inhaler 5     No current facility-administered medications for this visit. Emir BOO   Review of Systems   Constitutional: Negative for activity change, appetite change, chills, fatigue, fever and unexpected weight change. HENT: Positive for congestion. Eyes: Negative. Respiratory: Positive for cough, chest tightness and wheezing. Negative for shortness of breath. Cardiovascular: Negative for chest pain, palpitations and leg swelling. Gastrointestinal: Negative for abdominal pain, diarrhea, nausea and vomiting. Genitourinary: Negative. Musculoskeletal: Negative. Skin: Negative. Allergic/Immunologic: Positive for environmental allergies. Neurological: Negative. Hematological: Negative. Psychiatric/Behavioral: Negative. Negative for sleep disturbance. Physical exam   /74 (Site: Left Upper Arm, Position: Sitting, Cuff Size: Medium Adult)   Pulse 73   Temp 97.9 °F (36.6 °C) (Oral)   Ht 5' 7\" (1.702 m)   Wt 214 lb 12.8 oz (97.4 kg)   SpO2 95%   BMI 33.64 kg/m²      Wt Readings from Last 3 Encounters:   06/17/22 214 lb 12.8 oz (97.4 kg)   03/04/22 213 lb 12.8 oz (97 kg)   04/16/21 213 lb 3.2 oz (96.7 kg)     Physical Exam  Vitals and nursing note reviewed. Constitutional:       Appearance: Normal appearance. She is overweight. HENT:      Head: Normocephalic and atraumatic. Mouth/Throat:      Pharynx: Oropharynx is clear. Eyes:      Conjunctiva/sclera: Conjunctivae normal.   Pulmonary:      Effort: Pulmonary effort is normal. No tachypnea, bradypnea or respiratory distress. Skin:     Findings: No erythema or rash. Neurological:      Mental Status: She is alert and oriented to person, place, and time. Psychiatric:         Attention and Perception: Attention normal.         Mood and Affect: Mood normal.         Speech: Speech normal.         Behavior: Behavior normal.         Thought Content: Thought content normal.         Cognition and Memory: Cognition normal.         Judgment: Judgment normal.          Test results   Lung Nodule Screening     [] Qualifies    [x]Does not qualify   [] Declined    [] Completed     Assessment      Diagnosis Orders   1. Moderate persistent allergic asthma  tiotropium (SPIRIVA RESPIMAT) 1.25 MCG/ACT AERS inhaler    IN DME SUPPLY OR ACCESSORY, NOS   2. Chronic cough  IN DME SUPPLY OR ACCESSORY, NOS     Plan    -unsure why GI symptoms with Trelegy may be due ? Milk intolerance with the DPI.     -start Spiriva Respimat 1.25 mcg   -continue Symbicort 160/4.5 mcg BID , rinse after use   -avoid ill contacts/ allergens/ triggers  -keep UTD on recommended vaccinations  -rx for Acapella oscillating flutter device to help clear mucous     Will see Kym Mosley in: 2 month med check   billing based on medical decision making   Electronically signed by MYLA Bowles CNP on 6/17/2022 at 9:33 AM

## 2022-09-02 ENCOUNTER — OFFICE VISIT (OUTPATIENT)
Dept: PULMONOLOGY | Age: 67
End: 2022-09-02
Payer: MEDICARE

## 2022-09-02 VITALS
DIASTOLIC BLOOD PRESSURE: 72 MMHG | TEMPERATURE: 98 F | BODY MASS INDEX: 34.06 KG/M2 | HEIGHT: 67 IN | WEIGHT: 217 LBS | OXYGEN SATURATION: 96 % | HEART RATE: 81 BPM | SYSTOLIC BLOOD PRESSURE: 110 MMHG

## 2022-09-02 DIAGNOSIS — K44.9 PARAESOPHAGEAL HERNIA: ICD-10-CM

## 2022-09-02 DIAGNOSIS — R05.3 CHRONIC COUGH: Primary | ICD-10-CM

## 2022-09-02 DIAGNOSIS — J45.40 MODERATE PERSISTENT ALLERGIC ASTHMA: ICD-10-CM

## 2022-09-02 DIAGNOSIS — I48.91 ATRIAL FIBRILLATION, UNSPECIFIED TYPE (HCC): ICD-10-CM

## 2022-09-02 PROCEDURE — 1123F ACP DISCUSS/DSCN MKR DOCD: CPT | Performed by: NURSE PRACTITIONER

## 2022-09-02 PROCEDURE — 99214 OFFICE O/P EST MOD 30 MIN: CPT | Performed by: NURSE PRACTITIONER

## 2022-09-02 RX ORDER — NITROGLYCERIN 0.4 MG/1
0.4 TABLET SUBLINGUAL EVERY 5 MIN PRN
COMMUNITY

## 2022-09-02 RX ORDER — ISOSORBIDE MONONITRATE 30 MG/1
30 TABLET, EXTENDED RELEASE ORAL DAILY
COMMUNITY

## 2022-09-02 RX ORDER — TIOTROPIUM BROMIDE INHALATION SPRAY 1.56 UG/1
2 SPRAY, METERED RESPIRATORY (INHALATION) DAILY
Qty: 3 EACH | Refills: 3 | Status: SHIPPED | OUTPATIENT
Start: 2022-09-02 | End: 2023-09-02

## 2022-09-02 ASSESSMENT — ENCOUNTER SYMPTOMS
COUGH: 1
DIARRHEA: 0
VOMITING: 0
WHEEZING: 1
CHEST TIGHTNESS: 1
NAUSEA: 1
EYES NEGATIVE: 1
ABDOMINAL PAIN: 0
SHORTNESS OF BREATH: 1

## 2022-09-02 NOTE — PROGRESS NOTES
[facility]     Patient: Luisa Lucas, 69 y.o.   : 1955    Pt of Dr. Rhonda Correia   Patient presents with    Follow-up     2 month Asthma follow up Med Check         HPI  Vicki Briones is here for 2 month  follow up for Medication Follow Up   Mod persistent asthma. C/o chronic cough. Liked trelegy , improved breathing and less cough/ phlegm but caused severe GI upset. ( Does think she may have a milk allergy )   Was prescribed SPiriva Respimat to take with symbicort  Never started spiriva. Forgot all about it. Current symptoms:   Patient is experiencing SOB: yes, States this is her normal   Patient is experiencing wheezing: No  Patient states they have had a cough, chronic   Phlegm is sometimes. Patient is coughing up blood: no  Patient has been experiencing chest pains: yes, intermittent chest pains. She feels this is mostly all related to acid reflux , was started on PRN nitro by cardiology - has not taken. Is taking daily Protonix and PRN tums are helpful   Was also started on metoprolol by cardiology for AFIB - is not taking compliantly     Patient is currently taking the following inhaler(s): Symbicort ,Xopenex   Patient is using their rescue inhaler very seldom       has hiatal hernia - had 2 surgeries, known hernia is recurrent, scared to have another surgery , had complications of collapse lung in past   Insurance declined [place] . Abs eosinophil [address]  IgE wnl     SOCIAL HISTORY:  Social History     Tobacco Use    Smoking status: Never     Passive exposure: Yes    Smokeless tobacco: Never   Substance Use Topics    Alcohol use:  Yes     Alcohol/week: 0.0 standard drinks     Comment: occ    Drug use: No         CURRENT MEDICATIONS:  Current Outpatient Medications   Medication Sig Dispense Refill    isosorbide mononitrate (IMDUR) 30 MG extended release tablet Take 30 mg by mouth daily      nitroGLYCERIN (NITROSTAT) 0.4 MG SL tablet Place 0.4 mg under the tongue every 5 minutes as needed for Chest pain up to max of 3 total doses. If no relief after 1 dose, call 911. tiotropium (SPIRIVA RESPIMAT) 1.25 MCG/ACT AERS inhaler Inhale 2 puffs into the lungs daily 3 each 3    COLLAGEN PO Take by mouth      budesonide-formoterol (SYMBICORT) 160-4.5 MCG/ACT AERO Inhale 2 puffs into the lungs 2 times daily Rinse mouth after its use. 3 each 3    Polyethylene Glycol 3350 (MIRALAX PO)       levothyroxine (SYNTHROID) 100 MCG tablet Take 100 mcg by mouth Daily      Cholecalciferol (VITAMIN D3 PO) Take by mouth      pantoprazole (PROTONIX) 20 MG tablet Take 20 mg by mouth daily      SYNTHROID 88 MCG tablet TAKE 1 TABLET DAILY 90 tablet 3    naproxen sodium (ANAPROX) 220 MG tablet Take 220 mg by mouth 2 times daily (with meals)      Probiotic Product (PROBIOTIC-10) CAPS Take 1 capsule by mouth daily      Fexofenadine HCl (ALLEGRA PO) Take by mouth      levalbuterol (XOPENEX HFA) 45 MCG/ACT inhaler Inhale 1-2 puffs into the lungs every 4 hours as needed for Wheezing or Shortness of Breath 1 Inhaler 5    magnesium (MAGNESIUM-OXIDE) 250 MG TABS tablet Take 250 mg by mouth daily      Multiple Vitamins-Minerals (THERAPEUTIC MULTIVITAMIN-MINERALS) tablet Take 1 tablet by mouth daily      polyethylene glycol (GLYCOLAX) powder Take 17 g by mouth daily      fluticasone (FLONASE) 50 MCG/ACT nasal spray 2 sprays by Each Nostril route daily (Patient not taking: No sig reported) 3 Bottle 1     No current facility-administered medications for this visit. Tamara BOO   Review of Systems   Constitutional:  Positive for fatigue. Negative for activity change, appetite change, chills, fever and unexpected weight change. HENT: Negative. Eyes: Negative. Respiratory:  Positive for cough, chest tightness, shortness of breath and wheezing. Cardiovascular:  Positive for chest pain. Negative for palpitations and leg swelling. Gastrointestinal:  Positive for nausea. Negative for abdominal pain, diarrhea and vomiting. Uncontrolled GERD    Genitourinary: Negative. Musculoskeletal: Negative. Skin: Negative. Allergic/Immunologic: Positive for environmental allergies. Neurological: Negative. Hematological: Negative. Psychiatric/Behavioral:  Positive for sleep disturbance. Physical exam   /72   Pulse 81   Temp 98 °F (36.7 °C)   Ht 5' 7\" (1.702 m)   Wt 217 lb (98.4 kg)   SpO2 96%   BMI 33.99 kg/m²       Wt Readings from Last 3 Encounters:   09/02/22 217 lb (98.4 kg)   06/17/22 214 lb 12.8 oz (97.4 kg)   03/04/22 213 lb 12.8 oz (97 kg)     Physical Exam  Vitals and nursing note reviewed. Constitutional:       Appearance: Normal appearance. She is overweight. HENT:      Head: Normocephalic and atraumatic. Mouth/Throat:      Pharynx: Oropharynx is clear. Eyes:      Conjunctiva/sclera: Conjunctivae normal.   Pulmonary:      Effort: Pulmonary effort is normal. No tachypnea, bradypnea or respiratory distress. Skin:     Findings: No erythema or rash. Neurological:      Mental Status: She is alert and oriented to person, place, and time. Psychiatric:         Attention and Perception: Attention normal.         Mood and Affect: Mood normal.         Speech: Speech normal.         Behavior: Behavior normal.         Thought Content: Thought content normal.         Cognition and Memory: Cognition normal.         Judgment: Judgment normal.        Test results   Lung Nodule Screening     [] Qualifies    [x]Does not qualify   [] Declined    [] Completed     Assessment       ICD-10-CM    1. Chronic cough  R05.3       2. Moderate persistent allergic asthma  J45.40 tiotropium (SPIRIVA RESPIMAT) 1.25 MCG/ACT AERS inhaler      3. Atrial fibrillation, unspecified type ([address]  I48.91       4.  Paraesophageal hernia  K44.9         Chronic cough due to uncontrolled GERD 2/2 paraesophageal hernia vs. Cough,   Both are likely contributing       Plan    -talk to PCP about further GERD therapies.   -The pathophysiology of reflux is discussed. Anti-reflux measures such as raising the head of the bed, avoiding tight clothing or belts, avoiding eating late at night and not lying down shortly after mealtime and achieving weight loss are discussed. Avoid ASA, NSAID's, caffeine, peppermints, alcohol and tobacco. Patient should alert me if there are persistent symptoms, dysphagia, weight loss or GI bleeding.       -asthma Symptoms currently sub optimally controlled , add spiriva respimat 1.25 mcg 2 puffs daily, instructed on proper use.   [facility]  Continue symbicort 2 puffs /4.5 mcg   -needs to call cardiology if problems with metoprolol, otherwise if no side effects needs to resume as prescribed   -flonase/ Allegra for allergies PRN     -avoid ill contacts  -keep UTD on recommended vaccinations    Will see Kerrie Hall in: 3 months  billing based on time: total time on encounter 30 min   Electronically signed by MYLA Giraldo CNP on 9/2/2022 at 1:19 PM

## 2022-09-02 NOTE — PROGRESS NOTES
Patient is experiencing SOB: yes, States this is her normal     Patient is experiencing wheezing: No    Patient states they have had a Absent = 4 cough. Phlegm is none    Patient is coughing up blood: no    Patient has been experiencing chest pains: No    Patient is currently taking the following inhaler(s): Symbicort ,Xopenex       Patient is using their rescue inhaler very seldom         Other: Patient stated cardiologist prescribed her medication due to high heart rate and Blood Pressure . She is unaware at this time what it is called. Also stated she has not taken it this past week.  Patient also stated she had never started the Spiriva

## 2022-11-17 NOTE — PROGRESS NOTES
Patient is experiencing SOB: no    Patient is experiencing wheezing: No    Patient states they have had a Absent = 4 cough. Phlegm is none    Patient is coughing up blood: no    Patient has been experiencing chest pains: chest tightness, patient believes d/t hiatal hernia    Patient is currently taking the following inhaler(s): Xopenex, Spiriva, Symbicort    Patient is currently taking the following nebulizer treatment(s): NONE    Patient is using their rescue inhaler rarely (when sick)    Other: Spiriva gives her cramps and bowel turned green so she quit taking it for a month and then tried again and she experienced the same symptoms. Still taking Symbicort. Wants another flutter device \"green pickle\".

## 2022-11-18 ENCOUNTER — OFFICE VISIT (OUTPATIENT)
Dept: PULMONOLOGY | Age: 67
End: 2022-11-18
Payer: MEDICARE

## 2022-11-18 VITALS
OXYGEN SATURATION: 92 % | SYSTOLIC BLOOD PRESSURE: 118 MMHG | DIASTOLIC BLOOD PRESSURE: 62 MMHG | BODY MASS INDEX: 34.21 KG/M2 | HEIGHT: 67 IN | TEMPERATURE: 98.1 F | HEART RATE: 82 BPM | WEIGHT: 218 LBS

## 2022-11-18 DIAGNOSIS — K44.9 PARAESOPHAGEAL HERNIA: ICD-10-CM

## 2022-11-18 DIAGNOSIS — J45.40 MODERATE PERSISTENT ALLERGIC ASTHMA: Primary | ICD-10-CM

## 2022-11-18 PROBLEM — G89.4 CHRONIC PAIN DISORDER: Status: ACTIVE | Noted: 2018-11-07

## 2022-11-18 PROBLEM — G47.01 INSOMNIA SECONDARY TO CHRONIC PAIN: Status: ACTIVE | Noted: 2018-11-07

## 2022-11-18 PROBLEM — G89.29 INSOMNIA SECONDARY TO CHRONIC PAIN: Status: ACTIVE | Noted: 2018-11-07

## 2022-11-18 PROCEDURE — 1123F ACP DISCUSS/DSCN MKR DOCD: CPT | Performed by: NURSE PRACTITIONER

## 2022-11-18 PROCEDURE — 99214 OFFICE O/P EST MOD 30 MIN: CPT | Performed by: NURSE PRACTITIONER

## 2022-11-18 RX ORDER — PREDNISONE 50 MG/1
50 TABLET ORAL DAILY
Qty: 5 TABLET | Refills: 0 | Status: SHIPPED | OUTPATIENT
Start: 2022-11-18 | End: 2022-11-23

## 2022-11-18 RX ORDER — BUDESONIDE AND FORMOTEROL FUMARATE DIHYDRATE 160; 4.5 UG/1; UG/1
2 AEROSOL RESPIRATORY (INHALATION) 2 TIMES DAILY
Qty: 3 EACH | Refills: 3 | Status: SHIPPED | OUTPATIENT
Start: 2022-11-18 | End: 2023-11-18

## 2022-11-18 RX ORDER — AMOXICILLIN AND CLAVULANATE POTASSIUM 875; 125 MG/1; MG/1
1 TABLET, FILM COATED ORAL 2 TIMES DAILY
Qty: 14 TABLET | Refills: 0 | Status: SHIPPED | OUTPATIENT
Start: 2022-11-18 | End: 2022-11-25

## 2022-11-18 ASSESSMENT — ENCOUNTER SYMPTOMS
NAUSEA: 0
EYES NEGATIVE: 1
WHEEZING: 0
DIARRHEA: 0
ABDOMINAL PAIN: 0
COUGH: 0
CHEST TIGHTNESS: 0
VOMITING: 0
SHORTNESS OF BREATH: 0

## 2022-11-18 NOTE — PROGRESS NOTES
[facility]     Patient: Jose A Torres, 69 y.o.   : 2024    Pt of Dr. Ash Chong   Patient presents with    Follow-up     3 month asthma med check (Spiriva added last visit)      aga Whitfield is here for 3 months follow up for Asthma  Stopped spiriva respimat due to side effects. She was having abdominal cramping and noticed being a greenish tent to her bowel movements. She stopped and tried the Spiriva 3 different occasions the symptoms do go away off the Spiriva and recur once back on the Spiriva  Continues to take Symbicort 160/4.5 mcg twice a day. She is tolerating this well    Patient is experiencing SOB: no   planning another surgery for her esophageal hernia at [facility] state , complains of burning pain and tightness pain to her chest in the center and a little on the left side. The pain comes and goes. She has had full cardiac work-up by Dr. Shawanda Anguiano    Taking 80 mg Protonix daily    Patient is experiencing wheezing: No  Patient states they have had a Absent cough. Phlegm is none  Patient is coughing up blood: no     Patient has been experiencing chest pains: chest tightness, patient believes d/t hiatal hernia     Patient is currently taking the following inhaler(s): Xopenex, Spiriva, Symbicort     Patient is currently taking the following nebulizer treatment(s): NONE     Patient is using their rescue inhaler rarely (when sick)     Wants another flutter device \"green pickle\". She has felt this has been very effective in breaking up the mucus in her chest.  She is having less congestion in her throat    She felt the best relief from her breathing issues on Trelegy Ellipta but did not tolerate due to GI side effects as well  Any recent exacerbations that have required oral atb or steroids No  Last spirometry .   Mild obstruction    SOCIAL HISTORY:  Social History     Tobacco Use    Smoking status: Never Passive exposure: Yes    Smokeless tobacco: Never   Substance Use Topics    Alcohol use: Yes     Alcohol/week: 0.0 standard drinks     Comment: occ    Drug use: No         CURRENT MEDICATIONS:  Current Outpatient Medications   Medication Sig Dispense Refill    budesonide-formoterol (SYMBICORT) 160-4.5 MCG/ACT AERO Inhale 2 puffs into the lungs 2 times daily Rinse mouth after its use. 3 each 3    amoxicillin-clavulanate (AUGMENTIN) 875-125 MG per tablet Take 1 tablet by mouth 2 times daily for 7 days Start taking at onset of asthma exacerbation 14 tablet 0    predniSONE (DELTASONE) 50 MG tablet Take 1 tablet by mouth daily for 5 days Take at the onset of asthma exacerbation 5 tablet 0    isosorbide mononitrate (IMDUR) 30 MG extended release tablet Take 30 mg by mouth daily      nitroGLYCERIN (NITROSTAT) 0.4 MG SL tablet Place 0.4 mg under the tongue every 5 minutes as needed for Chest pain up to max of 3 total doses. If no relief after 1 dose, call 911.       COLLAGEN PO Take by mouth      Polyethylene Glycol 3350 (MIRALAX PO)       levothyroxine (SYNTHROID) 100 MCG tablet Take 100 mcg by mouth Daily      Cholecalciferol (VITAMIN D3 PO) Take by mouth      pantoprazole (PROTONIX) 20 MG tablet Take 20 mg by mouth daily      SYNTHROID 88 MCG tablet TAKE 1 TABLET DAILY 90 tablet 3    naproxen sodium (ANAPROX) 220 MG tablet Take 220 mg by mouth 2 times daily (with meals)      Probiotic Product (PROBIOTIC-10) CAPS Take 1 capsule by mouth daily      Fexofenadine HCl (ALLEGRA PO) Take by mouth      levalbuterol (XOPENEX HFA) 45 MCG/ACT inhaler Inhale 1-2 puffs into the lungs every 4 hours as needed for Wheezing or Shortness of Breath 1 Inhaler 5    magnesium (MAGNESIUM-OXIDE) 250 MG TABS tablet Take 250 mg by mouth daily      Multiple Vitamins-Minerals (THERAPEUTIC MULTIVITAMIN-MINERALS) tablet Take 1 tablet by mouth daily      fluticasone (FLONASE) 50 MCG/ACT nasal spray 2 sprays by Each Nostril route daily (Patient not taking: No sig reported) 3 Bottle 1     No current facility-administered medications for this visit. Oniel Ogmonisha RAJEEV   Review of Systems   Constitutional:  Negative for activity change, appetite change, chills, fatigue, fever and unexpected weight change. HENT: Negative. Eyes: Negative. Respiratory:  Negative for cough, chest tightness, shortness of breath and wheezing. Cardiovascular:  Positive for chest pain. Negative for palpitations and leg swelling. Gastrointestinal:  Negative for abdominal pain, diarrhea, nausea and vomiting. Genitourinary: Negative. Musculoskeletal: Negative. Skin: Negative. Neurological: Negative. Hematological: Negative. Psychiatric/Behavioral: Negative. Physical exam   /62 (Site: Left Lower Arm, Position: Sitting, Cuff Size: Medium Adult)   Pulse 82   Temp 98.1 °F (36.7 °C) (Skin)   Ht 5' 7\" (1.702 m)   Wt 218 lb (98.9 kg)   SpO2 92%   BMI 34.14 kg/m²       Wt Readings from Last 3 Encounters:   11/18/22 218 lb (98.9 kg)   09/02/22 217 lb (98.4 kg)   06/17/22 214 lb 12.8 oz (97.4 kg)     Physical Exam  Vitals and nursing note reviewed. Constitutional:       General: She is not in acute distress. Appearance: She is well-developed and overweight. HENT:      Mouth/Throat:      Lips: Pink. Mouth: Mucous membranes are moist.      Pharynx: Oropharynx is clear. No oropharyngeal exudate or posterior oropharyngeal erythema. Eyes:      Conjunctiva/sclera: Conjunctivae normal.   Neck:      Vascular: No JVD. Cardiovascular:      Rate and Rhythm: Normal rate and regular rhythm. Heart sounds: No murmur heard. No friction rub. Pulmonary:      Effort: Pulmonary effort is normal. No accessory muscle usage or respiratory distress. Breath sounds: Normal breath sounds. No wheezing, rhonchi or rales. Chest:      Chest wall: No tenderness. Musculoskeletal:      Right lower leg: No edema. Left lower leg: No edema.    Skin: General: Skin is warm and dry. Capillary Refill: Capillary refill takes less than 2 seconds. Nails: There is no clubbing. Neurological:      Mental Status: She is alert and oriented to person, place, and time. Psychiatric:         Mood and Affect: Mood normal.         Behavior: Behavior normal.         Thought Content: Thought content normal.         Judgment: Judgment normal.        Test results   Lung Nodule Screening     [] Qualifies    [x]Does not qualify   [] Declined    [] Completed     Assessment       ICD-10-CM    1. Moderate persistent allergic asthma  J45.40 budesonide-formoterol (SYMBICORT) 160-4.5 MCG/ACT AERO     amoxicillin-clavulanate (AUGMENTIN) 875-125 MG per tablet     predniSONE (DELTASONE) 50 MG tablet      2. Paraesophageal hernia  K44.9         Plan      -asthma symptoms currently optimally controlled , continue current therapies with Symbicort 160/4.52 puffs twice daily. Continue to rinse mouth well after use.   Continue as needed albuterol sulfate  Spiriva Respimat removed from med list.  Continue to stay off of this due to intolerance with side effects  Follow-up with your surgeon at [facility] regarding esophageal hernia  Continue daily PPI as prescribed for acid reflux symptoms  -avoid ill contacts/allergens/triggers  -keep UTD on recommended vaccinations    -Rx for Augmentin 875/125 mg tablets for duration of 7 days, prednisone 50 mg p.o. daily x5 days these were E scribed to her pharmacy there to be started at the onset of an asthma exacerbation due to her history with sinus infection and secondary respiratory infections when she does have flareups  (Please note that portions of this note may have been with a voice recognition program.  Efforts were made to edit the dictation but occasionally words are mis-transcribed )     Will see Tressa Calderon in: 6 months  billing based on medical decision making   Electronically signed by MYLA Howell CNP on 11/18/2022 at 11:02 AM

## 2023-06-02 ENCOUNTER — OFFICE VISIT (OUTPATIENT)
Dept: PULMONOLOGY | Age: 68
End: 2023-06-02
Payer: MEDICARE

## 2023-06-02 VITALS
HEART RATE: 83 BPM | OXYGEN SATURATION: 95 % | BODY MASS INDEX: 31.83 KG/M2 | SYSTOLIC BLOOD PRESSURE: 120 MMHG | DIASTOLIC BLOOD PRESSURE: 84 MMHG | WEIGHT: 202.8 LBS | HEIGHT: 67 IN

## 2023-06-02 DIAGNOSIS — J45.40 MODERATE PERSISTENT ALLERGIC ASTHMA: Primary | ICD-10-CM

## 2023-06-02 DIAGNOSIS — R05.3 CHRONIC COUGH: ICD-10-CM

## 2023-06-02 PROCEDURE — 1123F ACP DISCUSS/DSCN MKR DOCD: CPT | Performed by: NURSE PRACTITIONER

## 2023-06-02 PROCEDURE — 99214 OFFICE O/P EST MOD 30 MIN: CPT | Performed by: NURSE PRACTITIONER

## 2023-06-02 RX ORDER — BUDESONIDE AND FORMOTEROL FUMARATE DIHYDRATE 160; 4.5 UG/1; UG/1
2 AEROSOL RESPIRATORY (INHALATION) 2 TIMES DAILY
Qty: 3 EACH | Refills: 3 | Status: SHIPPED | OUTPATIENT
Start: 2023-06-02 | End: 2024-06-01

## 2023-06-02 RX ORDER — LEVALBUTEROL TARTRATE 45 UG/1
1-2 AEROSOL, METERED ORAL EVERY 4 HOURS PRN
Qty: 1 EACH | Refills: 5 | Status: SHIPPED | OUTPATIENT
Start: 2023-06-02

## 2023-06-02 ASSESSMENT — ENCOUNTER SYMPTOMS
DIARRHEA: 0
COUGH: 0
SHORTNESS OF BREATH: 0
WHEEZING: 0
EYES NEGATIVE: 1
ABDOMINAL PAIN: 0
VOMITING: 0
NAUSEA: 0

## 2023-06-02 NOTE — PROGRESS NOTES
[facility]     Patient: Lewie Bernheim, 77 y.o.   : 1955    Pt of Dr. Cecelia Gill   Patient presents with    Follow-up     6 month asthma follow up with no testing. SEVERO Jorgensen is here for 6 months follow up for Asthma  Stopped spiriva respimat due to side effects. She was having abdominal cramping and noticed being a greenish tent to her bowel movements. She stopped and tried the Spiriva 3 different occasions the symptoms do go away off the Spiriva and recur once back on the Spiriva  Continues to take Symbicort 160/4.5 mcg twice a day. She is tolerating this well     Treated for sinusitis/ bronchitis with Augmentin x 7 days and Prednisone burst x 5 days at last visit on 2022  Following with [facility] for esophageal hernia  S/p Robotic Fundoplasty Esophagogastric 2023  Uses Acapella PRN - reports effectiveness with use   Any new medical issues since last visit : No   Overall feeling good. Recent stress:  was in hospital     Any recent exacerbations that have required oral atb or steroids Yes: Medrol Dose Trevor 23     Previous Inhalers/ Treatment : Spiriva respimat - S/E, Trelegy - GI S/E's ( did feel the best from pulmonary standpoint on trelegy )      She requires her rescue inhaler  rarely     Allergy /sinus meds : , oral antihistamines: Allegra Daily   Current Treatments effective  Not on home O2     SOCIAL HISTORY:  Social History     Tobacco Use    Smoking status: Never     Passive exposure: Yes    Smokeless tobacco: Never   Substance Use Topics    Alcohol use: Not Currently     Comment: occ    Drug use: No       CURRENT MEDICATIONS:  Current Outpatient Medications   Medication Sig Dispense Refill    budesonide-formoterol (SYMBICORT) 160-4.5 MCG/ACT AERO Inhale 2 puffs into the lungs 2 times daily Rinse mouth after its use.  3 each 3    levalbuterol (XOPENEX HFA) 45 MCG/ACT inhaler Inhale 1-2 puffs

## 2023-06-13 ENCOUNTER — TELEPHONE (OUTPATIENT)
Dept: PULMONOLOGY | Age: 68
End: 2023-06-13

## 2023-06-19 DIAGNOSIS — J45.40 MODERATE PERSISTENT ALLERGIC ASTHMA: ICD-10-CM

## 2023-06-19 RX ORDER — LEVALBUTEROL TARTRATE 45 UG/1
1-2 AEROSOL, METERED ORAL EVERY 4 HOURS PRN
Qty: 30 G | Refills: 3 | Status: SHIPPED | OUTPATIENT
Start: 2023-06-19

## 2024-02-16 ENCOUNTER — OFFICE VISIT (OUTPATIENT)
Dept: PULMONOLOGY | Age: 69
End: 2024-02-16
Payer: MEDICARE

## 2024-02-16 VITALS
HEIGHT: 67 IN | TEMPERATURE: 98.2 F | DIASTOLIC BLOOD PRESSURE: 84 MMHG | SYSTOLIC BLOOD PRESSURE: 122 MMHG | HEART RATE: 82 BPM | OXYGEN SATURATION: 95 % | BODY MASS INDEX: 32.77 KG/M2 | WEIGHT: 208.8 LBS

## 2024-02-16 DIAGNOSIS — J06.9 VIRAL UPPER RESPIRATORY INFECTION: ICD-10-CM

## 2024-02-16 DIAGNOSIS — J45.40 MODERATE PERSISTENT ALLERGIC ASTHMA: ICD-10-CM

## 2024-02-16 DIAGNOSIS — J01.40 ACUTE NON-RECURRENT PANSINUSITIS: Primary | ICD-10-CM

## 2024-02-16 PROCEDURE — 99214 OFFICE O/P EST MOD 30 MIN: CPT | Performed by: NURSE PRACTITIONER

## 2024-02-16 PROCEDURE — 1123F ACP DISCUSS/DSCN MKR DOCD: CPT | Performed by: NURSE PRACTITIONER

## 2024-02-16 RX ORDER — AMOXICILLIN AND CLAVULANATE POTASSIUM 875; 125 MG/1; MG/1
1 TABLET, FILM COATED ORAL 2 TIMES DAILY
Qty: 14 TABLET | Refills: 0 | Status: SHIPPED | OUTPATIENT
Start: 2024-02-16 | End: 2024-02-23

## 2024-02-16 RX ORDER — AZITHROMYCIN 250 MG/1
250 TABLET, FILM COATED ORAL DAILY
COMMUNITY

## 2024-02-16 RX ORDER — IPRATROPIUM BROMIDE AND ALBUTEROL SULFATE 2.5; .5 MG/3ML; MG/3ML
1 SOLUTION RESPIRATORY (INHALATION) EVERY 4 HOURS
Qty: 120 ML | Refills: 3 | Status: SHIPPED | OUTPATIENT
Start: 2024-02-16

## 2024-02-16 NOTE — PROGRESS NOTES
[place] [facility]     Patient: NORM FRIAS, 68 y.o.   : 1955    Pt of Dr. Thompson     Subjective     Chief Complaint   Patient presents with    Follow-up     8 month asthma follow up with no testing.         HPI    Presents for scheduled 8 months asthma/ copd follow up   Feels and sounds terrible. , symptoms started 3-4 days ago , saw PCP   Took 3 days prednisone  Prescribed 5 day atb, still has 2 days left     Flu/ COVID- negative      Productive cough , more phlegm this morning - yellow   Taking Sudafed, cough drops, muccinex  Holding her head in pain, c/o right sided headache / ear ache/ cough/ chest tightness/ fatigue / poor appetite /occ. wheeze  Denies ill contacts  Denies recent travel   Has been using Symbicort compliantly, has needed Xopenex more often       SOCIAL HISTORY:  Social History     Tobacco Use    Smoking status: Never     Passive exposure: Yes    Smokeless tobacco: Never   Substance Use Topics    Alcohol use: Not Currently     Comment: occ    Drug use: No       CURRENT MEDICATIONS:  Current Outpatient Medications   Medication Sig Dispense Refill    VITAMIN A OP Apply 300 mcg to eye      B Complex-C-Folic Acid (SM B SUPER VITAMIN COMPLEX PO) Take by mouth      Ascorbic Acid (VITAMIN C ER PO) Take by mouth      VITAMIN E PO Take by mouth      UNABLE TO FIND GASTREX      Glucosamine-Chondroitin (MOVE FREE PO) Take by mouth      azithromycin (ZITHROMAX) 250 MG tablet Take 1 tablet by mouth daily STARTED 24 FOR 5 DAYS      ipratropium 0.5 mg-albuterol 2.5 mg (DUONEB) 0.5-2.5 (3) MG/3ML SOLN nebulizer solution Inhale 3 mLs into the lungs every 4 hours Until symptoms improve, then use every 4 hours as needed 120 mL 3    amoxicillin-clavulanate (AUGMENTIN) 875-125 MG per tablet Take 1 tablet by mouth 2 times daily for 7 days 14 tablet 0    levalbuterol (XOPENEX HFA) 45 MCG/ACT inhaler Inhale 1-2 puffs into the lungs every 4 hours as needed for

## 2024-04-19 ENCOUNTER — OFFICE VISIT (OUTPATIENT)
Dept: PULMONOLOGY | Age: 69
End: 2024-04-19

## 2024-04-19 ENCOUNTER — HOSPITAL ENCOUNTER (OUTPATIENT)
Dept: GENERAL RADIOLOGY | Age: 69
Discharge: HOME | End: 2024-04-19

## 2024-04-19 VITALS
BODY MASS INDEX: 33.49 KG/M2 | DIASTOLIC BLOOD PRESSURE: 84 MMHG | HEART RATE: 71 BPM | HEIGHT: 67 IN | OXYGEN SATURATION: 96 % | SYSTOLIC BLOOD PRESSURE: 130 MMHG | WEIGHT: 213.4 LBS | TEMPERATURE: 97.9 F

## 2024-04-19 DIAGNOSIS — J44.89 ASTHMA WITH COPD (CHRONIC OBSTRUCTIVE PULMONARY DISEASE) (HCC): Primary | ICD-10-CM

## 2024-04-19 DIAGNOSIS — Z00.6 EXAMINATION FOR NORMAL COMPARISON FOR CLINICAL RESEARCH: ICD-10-CM

## 2024-04-19 RX ORDER — BUDESONIDE, GLYCOPYRROLATE, AND FORMOTEROL FUMARATE 160; 9; 4.8 UG/1; UG/1; UG/1
2 AEROSOL, METERED RESPIRATORY (INHALATION) 2 TIMES DAILY
Qty: 1 EACH | Refills: 1 | Status: SHIPPED | OUTPATIENT
Start: 2024-04-19 | End: 2025-04-19

## 2024-04-19 ASSESSMENT — ENCOUNTER SYMPTOMS
COUGH: 1
SHORTNESS OF BREATH: 1

## 2024-04-19 NOTE — PATIENT INSTRUCTIONS
STOP SYMBICORT :     START SAMPLES OF BREZTRI : 2 PUFFS TWICE A DAY, RINSE MOUTH AFTER USE .   ALSO SENT ONE MONTH WITH 1 REFILL TO [facility] .   F/U

## 2024-04-19 NOTE — PROGRESS NOTES
[place] [facility]     Patient: NORM FRIAS, 69 y.o.   : 1955    Pt of Dr. Thompson     Subjective     Chief Complaint   Patient presents with    Follow-up     2 month asthma follow up with chest XR 24.        HPI    Presents for scheduled  2 month copd/asthma overlap with CXR : assessment/ plan from last visit:     Acute non-recurrent pansinusitis, URI, asthma exacerbation last visit 2024  -continue last 2 days of Zpack prescribed by    -   start  amoxicillin-clavulanate (AUGMENTIN) 875-125 MG per tablet; Take 1 tablet by mouth 2 times daily for 7 days  Viral upper respiratory infection- new   -rest/ keep hydrated, avoid contact with public until symptoms are better   -OTC cold/ cough meds with pain medication for headache   Moderate persistent allergic asthma- uncontrolled due to acute URI/sinus infection   Start -     ipratropium 0.5 mg-albuterol 2.5 mg (DUONEB) 0.5-2.5 (3) MG/3ML SOLN nebulizer solution; Inhale 3 mLs into the lungs every 4 hours Until symptoms improve, then use every 4 hours as needed- pt states has neb machine at home   -continue Symbicort 2 puffs BID   -no wheezing on exam . No further oral steroids prescribed             Currently: feeling much better   No longer using her nebulizer.   Still fatigued, \" low stamina\" since being ill     Taking Protonix still having uncontrolled GERD symptoms, has discussed with primary care   Asking about other inhalers  Using symbicort with good compliance, has had GI issues with Spiriva an Trelegy in past.   From previous notes: she felt best respiratory control on trelegy but could not tolerate Side effects       SOCIAL HISTORY:  Social History     Tobacco Use    Smoking status: Never     Passive exposure: Yes    Smokeless tobacco: Never   Substance Use Topics    Alcohol use: Not Currently     Comment: occ    Drug use: No       CURRENT MEDICATIONS:  Current Outpatient Medications   Medication

## 2024-06-07 ENCOUNTER — OFFICE VISIT (OUTPATIENT)
Dept: PULMONOLOGY | Age: 69
End: 2024-06-07
Payer: MEDICARE

## 2024-06-07 VITALS
BODY MASS INDEX: 32.96 KG/M2 | SYSTOLIC BLOOD PRESSURE: 126 MMHG | TEMPERATURE: 97.7 F | OXYGEN SATURATION: 96 % | HEART RATE: 86 BPM | DIASTOLIC BLOOD PRESSURE: 82 MMHG | WEIGHT: 210 LBS | HEIGHT: 67 IN

## 2024-06-07 DIAGNOSIS — K44.9 PARAESOPHAGEAL HERNIA: ICD-10-CM

## 2024-06-07 DIAGNOSIS — J44.89 ASTHMA WITH COPD (CHRONIC OBSTRUCTIVE PULMONARY DISEASE) (HCC): Primary | ICD-10-CM

## 2024-06-07 DIAGNOSIS — I48.0 PAROXYSMAL ATRIAL FIBRILLATION (HCC): ICD-10-CM

## 2024-06-07 DIAGNOSIS — G47.10 HYPERSOMNIA, UNSPECIFIED: ICD-10-CM

## 2024-06-07 DIAGNOSIS — R53.82 CHRONIC FATIGUE: ICD-10-CM

## 2024-06-07 PROCEDURE — 99214 OFFICE O/P EST MOD 30 MIN: CPT | Performed by: NURSE PRACTITIONER

## 2024-06-07 PROCEDURE — 1123F ACP DISCUSS/DSCN MKR DOCD: CPT | Performed by: NURSE PRACTITIONER

## 2024-06-07 RX ORDER — AMOXICILLIN AND CLAVULANATE POTASSIUM 875; 125 MG/1; MG/1
1 TABLET, FILM COATED ORAL 2 TIMES DAILY
Qty: 14 TABLET | Refills: 0 | Status: SHIPPED | OUTPATIENT
Start: 2024-06-07 | End: 2024-06-14

## 2024-06-07 RX ORDER — PREDNISONE 20 MG/1
40 TABLET ORAL DAILY
Qty: 10 TABLET | Refills: 0 | Status: SHIPPED | OUTPATIENT
Start: 2024-06-07 | End: 2024-06-12

## 2024-06-07 ASSESSMENT — ENCOUNTER SYMPTOMS
SINUS PRESSURE: 1
SHORTNESS OF BREATH: 1
COUGH: 1

## 2024-06-07 NOTE — PROGRESS NOTES
due to her persistent / recurrent URIs, hypersomnia, obesity and co morbid AFIB recommend HST to R/o ANDERSON   -     Home Sleep Study; Future- pt request [facility] sleep clinic for testing       Will see Wanda Flowers in: 3 months in pulm clinic - no testing   F/u with Galina YOU in [facility] sleep clinic after HST for results     billing based on medical decision making   Electronically signed by MYLA Salamanca CNP on 6/7/2024 at 11:58 AM

## 2024-07-24 DIAGNOSIS — K44.9 PARAESOPHAGEAL HERNIA: ICD-10-CM

## 2024-07-24 DIAGNOSIS — I48.0 PAROXYSMAL ATRIAL FIBRILLATION (HCC): ICD-10-CM

## 2024-07-24 DIAGNOSIS — J44.89 ASTHMA WITH COPD (CHRONIC OBSTRUCTIVE PULMONARY DISEASE) (HCC): ICD-10-CM

## 2024-07-24 DIAGNOSIS — G47.10 HYPERSOMNIA, UNSPECIFIED: ICD-10-CM

## 2024-07-24 DIAGNOSIS — R53.82 CHRONIC FATIGUE: ICD-10-CM

## 2024-08-02 ENCOUNTER — OFFICE VISIT (OUTPATIENT)
Dept: PULMONOLOGY | Age: 69
End: 2024-08-02
Payer: MEDICARE

## 2024-08-02 VITALS
WEIGHT: 205.4 LBS | OXYGEN SATURATION: 96 % | HEART RATE: 78 BPM | HEIGHT: 67 IN | DIASTOLIC BLOOD PRESSURE: 76 MMHG | BODY MASS INDEX: 32.24 KG/M2 | TEMPERATURE: 98.1 F | SYSTOLIC BLOOD PRESSURE: 122 MMHG

## 2024-08-02 DIAGNOSIS — I48.0 PAROXYSMAL ATRIAL FIBRILLATION (HCC): ICD-10-CM

## 2024-08-02 DIAGNOSIS — G47.10 HYPERSOMNIA: ICD-10-CM

## 2024-08-02 DIAGNOSIS — R53.82 CHRONIC FATIGUE: ICD-10-CM

## 2024-08-02 DIAGNOSIS — G47.33 OSA (OBSTRUCTIVE SLEEP APNEA): Primary | ICD-10-CM

## 2024-08-02 PROCEDURE — 99214 OFFICE O/P EST MOD 30 MIN: CPT | Performed by: PHYSICIAN ASSISTANT

## 2024-08-02 PROCEDURE — 1123F ACP DISCUSS/DSCN MKR DOCD: CPT | Performed by: PHYSICIAN ASSISTANT

## 2024-08-02 ASSESSMENT — ENCOUNTER SYMPTOMS
DIARRHEA: 0
ALLERGIC/IMMUNOLOGIC NEGATIVE: 1
NAUSEA: 0
EYES NEGATIVE: 1
STRIDOR: 0
SHORTNESS OF BREATH: 0
BACK PAIN: 0
COUGH: 0
CHEST TIGHTNESS: 0
WHEEZING: 0

## 2024-08-02 NOTE — PROGRESS NOTES
[place] [facility]    Wanda Scott, 69 y.o.  [id number]      Pt of [facility]  Nurses Notes   HST study follow up  Study Results  Initial Study Date -  7/16/23  AHI -  20    Total Sleep Time - 428 min)  Time with Sats below 88% - 35.6 min  Interval History       Wanda Scott is a 69 y.o. old female who comes in to review the results of her recent sleep study, to answer questions and to explore options for treatment. She has a hx of Afib s/p ablation 10 years ago. She states her Afib has been controlled since then. She told [facility] that she has fatigue and gets tired but today do not seem to have that complaint.   Allergies  Allergies   Allergen Reactions    Nexium [Esomeprazole Magnesium] Other (See Comments)     headaches    Trelegy Ellipta [Fluticasone-Umeclidin-Vilant] Nausea Only and Other (See Comments)     Abdominal cramping     Cipro Xr Nausea And Vomiting     Meds  Current Outpatient Medications   Medication Sig Dispense Refill    OLIVE LEAF PO Take by mouth daily      UNABLE TO FIND 3 times daily (with meals) GI RELIEF      UNABLE TO FIND 3 times daily (with meals) ADVANCED DIGESTIVE ENZYMES      Glucosamine-Chondroitin (MOVE FREE PO) Take by mouth      ipratropium 0.5 mg-albuterol 2.5 mg (DUONEB) 0.5-2.5 (3) MG/3ML SOLN nebulizer solution Inhale 3 mLs into the lungs every 4 hours Until symptoms improve, then use every 4 hours as needed 120 mL 3    levalbuterol (XOPENEX HFA) 45 MCG/ACT inhaler Inhale 1-2 puffs into the lungs every 4 hours as needed for Wheezing or Shortness of Breath 30 g 3    Polyethylene Glycol 3350 (MIRALAX PO)       levothyroxine (SYNTHROID) 100 MCG tablet Take 1 tablet by mouth Daily      pantoprazole (PROTONIX) 20 MG tablet Take 2 tablets by mouth in the morning and at bedtime      budesonide-formoterol (SYMBICORT) 160-4.5 MCG/ACT AERO Inhale 2 puffs into the lungs 2 times daily Rinse mouth after its use. 3 each 3    Multiple Vitamins-Minerals 
facility-administered medications for this visit.     .    ROS   Review of Systems     Physical exam   There were no vitals taken for this visit.     Physical Exam     Sleep Study     Assessment   {No diagnosis found. (Refresh or delete this SmartLink)}     Plan   Given symptoms,  will going proceed with a sleep study (HST vs PSG vs Split night)  Discussed the study at length with patient  Discussed treatment options if sleep study is positive  Encouraged to work on weight loss  Encouraged to get in 7 to 9 hours of sleep  Pulmonary hygiene discussed  Follow-up pending sleep study       Galina Stark PA-C, [facility]  [facility]  8/2/2024   (Please note that portions of this note may have been with a voice recognition program.  Efforts were made to edit the dictation but occasionally words are mis-transcribed )

## 2024-10-17 NOTE — PROGRESS NOTES
has moderate ANDERSON  - Discussed other treatment options including Inspire, OA and other surgerical intervnetions  - She would like an eval for Inspire  - Will refer to ENT  - Her AHI is 20 with no central events  - BMI 31  - Will await ENt eval     Galina Eduardo PA-C, [facility]  [facility]  10/18/2024     (Please note that portions of this note may have been with a voice recognition program.  Efforts were made to edit the dictation but occasionally words are mis-transcribed )

## 2024-10-18 ENCOUNTER — OFFICE VISIT (OUTPATIENT)
Dept: PULMONOLOGY | Age: 69
End: 2024-10-18
Payer: MEDICARE

## 2024-10-18 VITALS
SYSTOLIC BLOOD PRESSURE: 128 MMHG | HEART RATE: 59 BPM | DIASTOLIC BLOOD PRESSURE: 68 MMHG | HEIGHT: 67 IN | TEMPERATURE: 98.1 F | BODY MASS INDEX: 31.74 KG/M2 | WEIGHT: 202.2 LBS | OXYGEN SATURATION: 96 %

## 2024-10-18 DIAGNOSIS — E66.9 OBESITY (BMI 30-39.9): ICD-10-CM

## 2024-10-18 DIAGNOSIS — Z78.9 INTOLERANCE OF CONTINUOUS POSITIVE AIRWAY PRESSURE (CPAP) VENTILATION: ICD-10-CM

## 2024-10-18 DIAGNOSIS — G47.33 OSA (OBSTRUCTIVE SLEEP APNEA): Primary | ICD-10-CM

## 2024-10-18 PROCEDURE — 1123F ACP DISCUSS/DSCN MKR DOCD: CPT | Performed by: PHYSICIAN ASSISTANT

## 2024-10-18 PROCEDURE — 99214 OFFICE O/P EST MOD 30 MIN: CPT | Performed by: PHYSICIAN ASSISTANT

## 2024-10-18 RX ORDER — CRANBERRY FRUIT EXTRACT 650 MG
CAPSULE ORAL
COMMUNITY

## 2024-10-18 ASSESSMENT — ENCOUNTER SYMPTOMS
NAUSEA: 0
BACK PAIN: 0
CHEST TIGHTNESS: 0
SHORTNESS OF BREATH: 0
ALLERGIC/IMMUNOLOGIC NEGATIVE: 1
DIARRHEA: 0
WHEEZING: 0
STRIDOR: 0
COUGH: 0
EYES NEGATIVE: 1

## 2024-11-15 ENCOUNTER — OFFICE VISIT (OUTPATIENT)
Dept: PULMONOLOGY | Age: 69
End: 2024-11-15

## 2024-11-15 VITALS
SYSTOLIC BLOOD PRESSURE: 106 MMHG | BODY MASS INDEX: 31.52 KG/M2 | DIASTOLIC BLOOD PRESSURE: 80 MMHG | OXYGEN SATURATION: 98 % | TEMPERATURE: 98 F | HEART RATE: 71 BPM | WEIGHT: 200.8 LBS | HEIGHT: 67 IN

## 2024-11-15 DIAGNOSIS — J45.40 MODERATE PERSISTENT ALLERGIC ASTHMA: Primary | ICD-10-CM

## 2024-11-15 DIAGNOSIS — G47.33 OSA ON CPAP: ICD-10-CM

## 2024-11-15 RX ORDER — PREDNISONE 10 MG/1
40 TABLET ORAL DAILY
Qty: 20 TABLET | Refills: 0 | Status: SHIPPED | OUTPATIENT
Start: 2024-11-15 | End: 2024-11-20

## 2024-11-15 RX ORDER — ALBUTEROL SULFATE AND BUDESONIDE 90; 80 UG/1; UG/1
2 AEROSOL, METERED RESPIRATORY (INHALATION) EVERY 4 HOURS PRN
Qty: 10.7 G | Refills: 0 | Status: SHIPPED | OUTPATIENT
Start: 2024-11-15

## 2024-11-15 RX ORDER — BUDESONIDE AND FORMOTEROL FUMARATE DIHYDRATE 160; 4.5 UG/1; UG/1
2 AEROSOL RESPIRATORY (INHALATION) 2 TIMES DAILY
Qty: 3 EACH | Refills: 3 | Status: SHIPPED | OUTPATIENT
Start: 2024-11-15 | End: 2025-11-15

## 2024-11-15 ASSESSMENT — ENCOUNTER SYMPTOMS
SHORTNESS OF BREATH: 1
SINUS PRESSURE: 1
COUGH: 1

## 2024-11-15 NOTE — PROGRESS NOTES
[place] [facility]     Patient: NORM FRIAS, 69 y.o.   : 1955  11/15/2024    Pt of Dr. Thompson     Subjective     Chief Complaint   Patient presents with    Follow-up     5mo Asthma w/COPD f/u w/o testing.  Doing well.        HPI    Presents for scheduled 5 month asthma/ copd overlap     Since last visit, completed HST:  moderate ANDERSON.  Does not tolerate CPAP therapy, in process of inspire evaluation , saw Dr Marcelino     Current INH: Symbicort 160/4.5 mcg - takes every morning , not always compliant with evening dose , is working well.   Asking about airsupra   Does worse in fall/ winter.     No longer using her nebulizer due to symptoms being controlled    Taking Protonix still having uncontrolled GERD symptoms, has discussed with primary care   Asking about other inhalers  Using symbicort with good compliance, has had GI issues with Spiriva an Trelegy in past.   From previous notes: she felt best respiratory control on trelegy but could not tolerate Side effects     SOCIAL HISTORY:  Social History     Tobacco Use    Smoking status: Never     Passive exposure: Yes    Smokeless tobacco: Never   Vaping Use    Vaping status: Never Used   Substance Use Topics    Alcohol use: Not Currently    Drug use: No       CURRENT MEDICATIONS:  Current Outpatient Medications   Medication Sig Dispense Refill    Albuterol-Budesonide (AIRSUPRA) 90-80 MCG/ACT AERO Inhale 2 puffs into the lungs every 4 hours as needed (wheezing/ shortness of breath) 10.7 g 0    budesonide-formoterol (SYMBICORT) 160-4.5 MCG/ACT AERO Inhale 2 puffs into the lungs 2 times daily Rinse mouth after its use. 3 each 3    amoxicillin-clavulanate (AUGMENTIN) 875-125 MG per tablet Take 1 tablet by mouth 2 times daily for 7 days 14 tablet 0    predniSONE (DELTASONE) 10 MG tablet Take 4 tablets by mouth daily for 5 days 20 tablet 0    respiratory syncytial vaccine, adjuvanted (AREXVY) 120 MCG/0.5ML injection Inject 0.5 mLs

## 2024-11-18 ENCOUNTER — TELEPHONE (OUTPATIENT)
Dept: PULMONOLOGY | Age: 69
End: 2024-11-18

## 2024-11-18 DIAGNOSIS — J45.40 MODERATE PERSISTENT ALLERGIC ASTHMA: Primary | ICD-10-CM

## 2024-11-18 RX ORDER — ALBUTEROL SULFATE 90 UG/1
2 INHALANT RESPIRATORY (INHALATION) 4 TIMES DAILY PRN
Qty: 18 G | Refills: 5 | Status: SHIPPED | OUTPATIENT
Start: 2024-11-18

## 2024-11-18 NOTE — TELEPHONE ENCOUNTER
Called and spoke with Yina to relay this information. They verified that they have received the new script. Thank you!

## 2024-11-18 NOTE — TELEPHONE ENCOUNTER
Discontinued airsupra since insurance will not cover.   Sent new Rx for albuterol sulfate for rescue inhaler   Pt to continue Symbicort use.  She was using this when I saw her but not compliantly.   Instructed to use symbicort 2 puffs BID   NO trelegy

## 2024-11-18 NOTE — TELEPHONE ENCOUNTER
Gianna from [facility] called in to speak about Wanda's inhalers. Insurance is not going to cover the Airsupra which was written at last visit... wasn't sure if you wanted to write for reg albuerol or start a PA? Also she wanted me to verify that you were wanting to start the Symbicort as well. I explained to her the directions of you last office note, but they wanted verification due to patient having side effects of the Trellegy. She asked that we give them a call back at [phone] reference number [id number]    Please advise! Thank you!

## 2024-11-22 ENCOUNTER — TELEPHONE (OUTPATIENT)
Dept: PULMONOLOGY | Age: 69
End: 2024-11-22

## 2024-11-22 NOTE — TELEPHONE ENCOUNTER
Insurance would not cover Airsupra which was the new rescue inhaler I wanted her to try, so I sent in prescription to go back to albuterol sulfate. Sent Albuterol script to [facility], that should not have needed a PA

## 2024-11-22 NOTE — TELEPHONE ENCOUNTER
Pt called stating that her albuterol sulfate is still pending, needing to know if they need a prior auth for this, please advise thank you

## 2025-06-06 ENCOUNTER — OFFICE VISIT (OUTPATIENT)
Dept: PULMONOLOGY | Age: 70
End: 2025-06-06
Payer: MEDICARE

## 2025-06-06 VITALS
BODY MASS INDEX: 30.61 KG/M2 | SYSTOLIC BLOOD PRESSURE: 112 MMHG | HEART RATE: 80 BPM | OXYGEN SATURATION: 98 % | WEIGHT: 195 LBS | HEIGHT: 67 IN | DIASTOLIC BLOOD PRESSURE: 64 MMHG | TEMPERATURE: 98.1 F

## 2025-06-06 DIAGNOSIS — J45.40 MODERATE PERSISTENT ALLERGIC ASTHMA: ICD-10-CM

## 2025-06-06 DIAGNOSIS — J44.89 ASTHMA WITH COPD (CHRONIC OBSTRUCTIVE PULMONARY DISEASE) (HCC): Primary | ICD-10-CM

## 2025-06-06 DIAGNOSIS — E66.9 OBESITY (BMI 30-39.9): ICD-10-CM

## 2025-06-06 PROCEDURE — 99214 OFFICE O/P EST MOD 30 MIN: CPT

## 2025-06-06 PROCEDURE — 1159F MED LIST DOCD IN RCRD: CPT

## 2025-06-06 PROCEDURE — 1123F ACP DISCUSS/DSCN MKR DOCD: CPT

## 2025-06-06 PROCEDURE — 1160F RVW MEDS BY RX/DR IN RCRD: CPT

## 2025-06-06 RX ORDER — ALBUTEROL SULFATE 90 UG/1
2 INHALANT RESPIRATORY (INHALATION) 4 TIMES DAILY PRN
Qty: 18 G | Refills: 5 | Status: SHIPPED | OUTPATIENT
Start: 2025-06-06

## 2025-06-06 RX ORDER — LANOLIN ALCOHOL/MO/W.PET/CERES
400 CREAM (GRAM) TOPICAL DAILY
COMMUNITY

## 2025-06-06 RX ORDER — TURMERIC/TURMERIC EXT/PEPR EXT 900-100 MG
CAPSULE ORAL
COMMUNITY

## 2025-06-06 RX ORDER — BUDESONIDE AND FORMOTEROL FUMARATE DIHYDRATE 160; 4.5 UG/1; UG/1
2 AEROSOL RESPIRATORY (INHALATION) 2 TIMES DAILY
Qty: 3 EACH | Refills: 3 | Status: SHIPPED | OUTPATIENT
Start: 2025-06-06 | End: 2026-06-06

## 2025-06-06 NOTE — PROGRESS NOTES
[place] [facility]    Patient: NORM FRIAS, 70 y.o.   : 1955    Patient of Dr. Thompson    Assessment/Plan   Assessment & Plan            1. Asthma with COPD (chronic obstructive pulmonary disease) ([facility])  -Continue Symbicort, Inhale 2 puffs into the lungs 2 times daily. Rinse mouth with water, gargle, and spit after use.   -Continue Albuterol PRN  -Duoneb nebulizer PRN- not using unless she has a cold  -Discussed albuterol inhaler and nebulizer use. Reviewed signs and symptoms indicating need for use including Shortness of breath and wheezing. Discussed with patient the importance of using inhaler or nebulizer within the prescribed time frames. Patient verbalized understanding to use one or the other not both within prescribed time frame. Patient also verbalized understanding that if they experience no relief after using albuterol and resting for 15 minutes they need to go to nearest ER or call 911.   -Asthma action plan discussed  -Full PFT study to be completed prior to next visit  -Continue Acapella PRN  -Reviewed preventative vaccinations    2. Obesity (BMI 30-39.9)  -Counseled patient on weight loss       Advised patient to call office with any changes, questions, or concerns regarding respiratory status or issues with prescribed medications    Return in about 6 months (around 2025) for For COPD/asthma overlap. PFT prior.     The patient (or guardian, if applicable) and other individuals in attendance with the patient were advised that Artificial Intelligence will be utilized during this visit to record, process the conversation to generate a clinical note, and support improvement of the AI technology. The patient (or guardian, if applicable) and other individuals in attendance at the appointment consented to the use of AI, including the recording.       Subjective     Chief Complaint   Patient presents with    Follow-up     6mo Asthma f/u w/o testing.

## 2025-06-10 ENCOUNTER — TELEPHONE (OUTPATIENT)
Dept: PULMONOLOGY | Age: 70
End: 2025-06-10

## 2025-06-10 NOTE — TELEPHONE ENCOUNTER
Patient called in  asking for Levalbuterol (xopenex) has really work for her if you can put an order in for her instead of the albuterol.     She also dey she would really like this tiotropium Nasel spray if we can send these medication to the [facility] in [place] please.     I am re-faxed the order and notes to [facility].

## 2025-06-12 DIAGNOSIS — J44.89 ASTHMA WITH COPD (CHRONIC OBSTRUCTIVE PULMONARY DISEASE) (HCC): Primary | ICD-10-CM

## 2025-06-12 RX ORDER — LEVALBUTEROL TARTRATE 45 UG/1
1-2 AEROSOL, METERED ORAL EVERY 4 HOURS PRN
Qty: 30 G | Refills: 3 | Status: SHIPPED | OUTPATIENT
Start: 2025-06-12

## 2025-06-12 NOTE — TELEPHONE ENCOUNTER
I sent over the levalbuterol. Can you clarify on the nasal spray, is she requesting Atrovent ? (Ipratropium bromide nasal spray)

## 2025-06-16 NOTE — TELEPHONE ENCOUNTER
I called and spoke to patient and she was unsure the name of the nasal spray but she knew it was ipratropium in it....... you can do the Atrovent (Ipratropium bromide nasal spray

## 2025-07-31 NOTE — PROGRESS NOTES
Wanda Scott         [id number]  8/1/2025   Chief Complaint   Patient presents with    Follow-up     josef last seen 10/2024 by galina, referred to julian last visit for musa reyna download, per patient she is not seeing julian for inspire, only for \"the flap in her throat making her cough\" and she wants to keep this appointment        Pt of Dr. Donna MARSHALL    PAP Download  Original or initial AHI: 20     Date of initial study: 07/16/2024      Compliant  0%     Noncompliant 3 %         PAP Type AutoSet    Level  5 / 15     Leaks (95th percentile): 0    Avg Hrs/Day 2.5    AHI: 0.4     Recorded compliance dates: 06/30/2025 - 07/29/2025    Machine/Mfg:   [x] ResMed    [] Clark / Respironics    Interface:   [x] Nasal    [] Nasal pillows   [] FFM    Durable Medical Equipment Company:      [employer]    Neck Size:  13.25    Mallampati:  3    ESS:  3  SAQLI: 85    Scan of the most recent download:                        Results  - Diagnostic Testing:    - Baseline sleep study: Moderate sleep apnea with episodes occurring 20 times an hour  '  Presentation:   History of Present Illness  The patient is a 70-year-old female who presents for a 1-year follow-up of obstructive sleep apnea.    Obstructive Sleep Apnea  She was previously under the care of Galina, but it has been a year since her last visit. She was referred to an ENT specialist to discuss the possibility of using Inspire   She has undergone four shoulder surgeries and three other procedures, which have left her feeling mentally drained.   Due to her history of surgical complications she has declined to go for inspire surgery     She continues to struggle with her PAP machine, which she feels worsens her condition rather than improving it.    Despite trying different masks and pressure settings, she has not found any relief.   She is also concerned about the varying readings from her machine, sometimes showing no episodes and other times indicating up to 2

## 2025-08-01 ENCOUNTER — OFFICE VISIT (OUTPATIENT)
Dept: PULMONOLOGY | Age: 70
End: 2025-08-01
Payer: MEDICARE

## 2025-08-01 VITALS
HEART RATE: 74 BPM | DIASTOLIC BLOOD PRESSURE: 78 MMHG | HEIGHT: 66 IN | WEIGHT: 195 LBS | TEMPERATURE: 97.8 F | OXYGEN SATURATION: 98 % | BODY MASS INDEX: 31.34 KG/M2 | SYSTOLIC BLOOD PRESSURE: 120 MMHG

## 2025-08-01 DIAGNOSIS — E06.5 THYROIDITIS, CHRONIC: ICD-10-CM

## 2025-08-01 DIAGNOSIS — G93.32 CHRONIC FATIGUE SYNDROME: ICD-10-CM

## 2025-08-01 DIAGNOSIS — Z78.9 INTOLERANCE OF CONTINUOUS POSITIVE AIRWAY PRESSURE (CPAP) VENTILATION: ICD-10-CM

## 2025-08-01 DIAGNOSIS — G47.33 MODERATE OBSTRUCTIVE SLEEP APNEA: Primary | ICD-10-CM

## 2025-08-01 DIAGNOSIS — J45.40 MODERATE PERSISTENT ALLERGIC ASTHMA: ICD-10-CM

## 2025-08-01 PROCEDURE — 99214 OFFICE O/P EST MOD 30 MIN: CPT | Performed by: NURSE PRACTITIONER

## 2025-08-01 PROCEDURE — 1160F RVW MEDS BY RX/DR IN RCRD: CPT | Performed by: NURSE PRACTITIONER

## 2025-08-01 PROCEDURE — 1123F ACP DISCUSS/DSCN MKR DOCD: CPT | Performed by: NURSE PRACTITIONER

## 2025-08-01 PROCEDURE — 1159F MED LIST DOCD IN RCRD: CPT | Performed by: NURSE PRACTITIONER

## 2025-08-01 RX ORDER — BUDESONIDE AND FORMOTEROL FUMARATE DIHYDRATE 160; 4.5 UG/1; UG/1
2 AEROSOL RESPIRATORY (INHALATION) 2 TIMES DAILY
Qty: 3 EACH | Refills: 3 | Status: SHIPPED | OUTPATIENT
Start: 2025-08-01 | End: 2026-08-01

## 2025-08-01 RX ORDER — FAMOTIDINE 10 MG
10 TABLET ORAL 2 TIMES DAILY
COMMUNITY